# Patient Record
Sex: MALE | Race: OTHER | NOT HISPANIC OR LATINO | Employment: FULL TIME | URBAN - METROPOLITAN AREA
[De-identification: names, ages, dates, MRNs, and addresses within clinical notes are randomized per-mention and may not be internally consistent; named-entity substitution may affect disease eponyms.]

---

## 2018-01-09 ENCOUNTER — OFFICE VISIT (OUTPATIENT)
Dept: URGENT CARE | Facility: CLINIC | Age: 53
End: 2018-01-09
Payer: MEDICAID

## 2018-01-09 VITALS
WEIGHT: 165 LBS | RESPIRATION RATE: 18 BRPM | SYSTOLIC BLOOD PRESSURE: 115 MMHG | HEIGHT: 66 IN | TEMPERATURE: 97 F | DIASTOLIC BLOOD PRESSURE: 71 MMHG | HEART RATE: 80 BPM | OXYGEN SATURATION: 97 % | BODY MASS INDEX: 26.52 KG/M2

## 2018-01-09 DIAGNOSIS — R05.9 COUGH: Primary | ICD-10-CM

## 2018-01-09 DIAGNOSIS — J40 BRONCHITIS: ICD-10-CM

## 2018-01-09 PROCEDURE — 99203 OFFICE O/P NEW LOW 30 MIN: CPT | Mod: S$GLB,,, | Performed by: FAMILY MEDICINE

## 2018-01-09 RX ORDER — BETAMETHASONE SODIUM PHOSPHATE AND BETAMETHASONE ACETATE 3; 3 MG/ML; MG/ML
6 INJECTION, SUSPENSION INTRA-ARTICULAR; INTRALESIONAL; INTRAMUSCULAR; SOFT TISSUE
Status: COMPLETED | OUTPATIENT
Start: 2018-01-09 | End: 2018-01-09

## 2018-01-09 RX ORDER — AZITHROMYCIN 250 MG/1
TABLET, FILM COATED ORAL
Qty: 5 TABLET | Refills: 0 | Status: SHIPPED | OUTPATIENT
Start: 2018-01-09

## 2018-01-09 RX ADMIN — BETAMETHASONE SODIUM PHOSPHATE AND BETAMETHASONE ACETATE 6 MG: 3; 3 INJECTION, SUSPENSION INTRA-ARTICULAR; INTRALESIONAL; INTRAMUSCULAR; SOFT TISSUE at 11:01

## 2018-01-09 NOTE — PROGRESS NOTES
"Subjective:       Patient ID: Devyn Hazel is a 52 y.o. male.    Vitals:  height is 5' 6" (1.676 m) and weight is 74.8 kg (165 lb). His tympanic temperature is 97 °F (36.1 °C). His blood pressure is 115/71 and his pulse is 80. His respiration is 18 and oxygen saturation is 97%.     Chief Complaint: Cough    Cough   This is a new problem. The current episode started 1 to 4 weeks ago. The problem has been unchanged. The problem occurs constantly. The cough is non-productive. Associated symptoms include nasal congestion. Pertinent negatives include no chest pain, chills, ear pain, eye redness, fever, headaches, myalgias, sore throat, shortness of breath or wheezing. Nothing aggravates the symptoms. Treatments tried: Day quil. The treatment provided no relief.     Review of Systems   Constitution: Negative for chills, fever and malaise/fatigue.   HENT: Negative for congestion, ear pain, hoarse voice and sore throat.    Eyes: Negative for discharge and redness.   Cardiovascular: Negative for chest pain, dyspnea on exertion and leg swelling.   Respiratory: Positive for cough. Negative for shortness of breath, sputum production and wheezing.    Musculoskeletal: Negative for myalgias.   Gastrointestinal: Negative for abdominal pain and nausea.   Neurological: Negative for headaches.       Objective:      Physical Exam   Constitutional: He is oriented to person, place, and time. He appears well-developed and well-nourished. He is cooperative.  Non-toxic appearance. He does not appear ill. No distress.   HENT:   Head: Normocephalic and atraumatic.   Right Ear: Hearing, tympanic membrane, external ear and ear canal normal.   Left Ear: Hearing, tympanic membrane, external ear and ear canal normal.   Nose: Nose normal. No mucosal edema, rhinorrhea or nasal deformity. No epistaxis. Right sinus exhibits no maxillary sinus tenderness and no frontal sinus tenderness. Left sinus exhibits no maxillary sinus tenderness and no frontal " sinus tenderness.   Mouth/Throat: Uvula is midline, oropharynx is clear and moist and mucous membranes are normal. No trismus in the jaw. Normal dentition. No uvula swelling. No posterior oropharyngeal erythema.   Eyes: Conjunctivae and lids are normal. Right eye exhibits no discharge. Left eye exhibits no discharge. No scleral icterus.   Sclera clear bilat   Neck: Trachea normal, normal range of motion, full passive range of motion without pain and phonation normal. Neck supple.   Cardiovascular: Normal rate, regular rhythm, normal heart sounds, intact distal pulses and normal pulses.    Pulmonary/Chest: Effort normal and breath sounds normal. He has no wheezes. He has no rales. He exhibits no tenderness.   Abdominal: Soft. Normal appearance and bowel sounds are normal. He exhibits no distension, no pulsatile midline mass and no mass. There is no tenderness.   Musculoskeletal: Normal range of motion. He exhibits no edema or deformity.   Neurological: He is alert and oriented to person, place, and time. He exhibits normal muscle tone. Coordination normal.   Skin: Skin is warm, dry and intact. He is not diaphoretic. No pallor.   Psychiatric: He has a normal mood and affect. His speech is normal and behavior is normal. Judgment and thought content normal. Cognition and memory are normal.   Nursing note and vitals reviewed.      Assessment:       1. Cough    2. Bronchitis        Plan:         Cough  -     X-Ray Chest PA And Lateral; Future; Expected date: 01/09/2018    Bronchitis  -     betamethasone acetate-betamethasone sodium phosphate injection 6 mg; Inject 1 mL (6 mg total) into the muscle one time.  -     azithromycin (ZITHROMAX Z-GHASSAN) 250 MG tablet; Take 2 tablets (500 mg) on  Day 1,  followed by 1 tablet (250 mg) once daily on Days 2 through 5.  Dispense: 5 tablet; Refill: 0        mucinex dm one bid  claritin one daily

## 2018-01-09 NOTE — PATIENT INSTRUCTIONS
What Is Acute Bronchitis?  Acute bronchitis is when the airways in your lungs (bronchial tubes) become red and swollen (inflamed). It is usually caused by a viral infection. But it can also occur because of a bacteria or allergen. Symptoms include a cough that produces yellow or greenish mucus and can last for days or sometimes weeks.  Inside healthy lungs    Air travels in and out of the lungs through the airways. The linings of these airways produce sticky mucus. This mucus traps particles that enter the lungs. Tiny structures called cilia then sweep the particles out of the airways.     Healthy airway: Airways are normally open. Air moves in and out easily.      Healthy cilia: Tiny, hairlike cilia sweep mucus and particles up and out of the airways.   Lungs with bronchitis  Bronchitis often occurs with a cold or the flu virus. The airways become inflamed (red and swollen). There is a deep hacking cough from the extra mucus. Other symptoms may include:  · Wheezing  · Chest discomfort  · Shortness of breath  · Mild fever  A second infection, this time due to bacteria, may then occur. And airways irritated by allergens or smoke are more likely to get infected.        Inflamed airway: Inflammation and extra mucus narrow the airway, causing shortness of breath.      Impaired cilia: Extra mucus impairs cilia, causing congestion and wheezing. Smoking makes the problem worse.   Making a diagnosis  A physical exam, health history, and certain tests help your healthcare provider make the diagnosis.  Health history  Your healthcare provider will ask you about your symptoms.  The exam  Your provider listens to your chest for signs of congestion. He or she may also check your ears, nose, and throat.  Possible tests  · A sputum test for bacteria. This requires a sample of mucus from your lungs.  · A nasal or throat swab. This tests to see if you have a bacterial infection.  · A chest X-ray. This is done if your healthcare  provider thinks you have pneumonia.  · Tests to check for an underlying condition. Other tests may be done to check for things such as allergies, asthma, or COPD (chronic obstructive pulmonary disease). You may need to see a specialist for more lung function testing.  Treating a cough  The main treatment for bronchitis is easing symptoms. Avoiding smoke, allergens, and other things that trigger coughing can often help. If the infection is bacterial, you may be given antibiotics. During the illness, it's important to get plenty of sleep. To ease symptoms:  · Dont smoke. Also avoid secondhand smoke.  · Use a humidifier. Or try breathing in steam from a hot shower. This may help loosen mucus.  · Drink a lot of water and juice. They can soothe the throat and may help thin mucus.  · Sit up or use extra pillows when in bed. This helps to lessen coughing and congestion.  · Ask your provider about using medicine. Ask about using cough medicine, pain and fever medicine, or a decongestant.  Antibiotics  Most cases of bronchitis are caused by cold or flu viruses. They dont need antibiotics to treat them, even if your mucus is thick and green or yellow. Antibiotics dont treat viral illness and antibiotics have not been shown to have any benefit in cases of acute bronchitis. Taking antibiotics when they are not needed increases your risk of getting an infection later that is antibiotic-resistant. Antibiotics can also cause severe cases of diarrhea that require other antibiotics to treat.  It is important that you accept your healthcare provider's opinion to not use antibiotics. Your provider will prescribe antibiotics if the infection is caused by bacteria. If they are prescribed:  · Take all of the medicine. Take the medicine until it is used up, even if symptoms have improved. If you dont, the bronchitis may come back.  · Take the medicines as directed. For instance, some medicines should be taken with food.  · Ask about  side effects. Ask your provider or pharmacist what side effects are common, and what to do about them.  Follow-up care  You should see your provider again in 2 to 3 weeks. By this time, symptoms should have improved. An infection that lasts longer may mean you have a more serious problem.  Prevention  · Avoid tobacco smoke. If you smoke, quit. Stay away from smoky places. Ask friends and family not to smoke around you, or in your home or car.  · Get checked for allergies.  · Ask your provider about getting a yearly flu shot. Also ask about pneumococcal or pneumonia shots.  · Wash your hands often. This helps reduce the chance of picking up viruses that cause colds and flu.  Call your healthcare provider if:  · Symptoms worsen, or you have new symptoms  · Breathing problems worsen or  become severe  · Symptoms dont get better within a week, or within 3 days of taking antibiotics   Date Last Reviewed: 2/1/2017  © 1413-8335 [employer]. [address] [place] [address], [place] [address]. All rights reserved. This information is not intended as a substitute for professional medical care. Always follow your healthcare professional's instructions.

## 2018-07-19 ENCOUNTER — HOSPITAL ENCOUNTER (EMERGENCY)
Facility: HOSPITAL | Age: 53
Discharge: HOME | End: 2018-07-19
Attending: EMERGENCY MEDICINE
Payer: MEDICAID

## 2018-07-19 VITALS
TEMPERATURE: 98 F | SYSTOLIC BLOOD PRESSURE: 151 MMHG | RESPIRATION RATE: 18 BRPM | DIASTOLIC BLOOD PRESSURE: 82 MMHG | HEART RATE: 66 BPM | WEIGHT: 145 LBS | HEIGHT: 66 IN | BODY MASS INDEX: 23.3 KG/M2 | OXYGEN SATURATION: 99 %

## 2018-07-19 DIAGNOSIS — K08.89 PAIN, DENTAL: Primary | ICD-10-CM

## 2018-07-19 PROCEDURE — 99283 EMERGENCY DEPT VISIT LOW MDM: CPT | Mod: 25

## 2018-07-19 PROCEDURE — 96372 THER/PROPH/DIAG INJ SC/IM: CPT

## 2018-07-19 PROCEDURE — 63600175 PHARM REV CODE 636 W HCPCS: Performed by: EMERGENCY MEDICINE

## 2018-07-19 RX ORDER — KETOROLAC TROMETHAMINE 30 MG/ML
30 INJECTION, SOLUTION INTRAMUSCULAR; INTRAVENOUS
Status: COMPLETED | OUTPATIENT
Start: 2018-07-19 | End: 2018-07-19

## 2018-07-19 RX ORDER — NAPROXEN 500 MG/1
500 TABLET ORAL 2 TIMES DAILY WITH MEALS
Qty: 30 TABLET | Refills: 0 | OUTPATIENT
Start: 2018-07-19 | End: 2020-01-21

## 2018-07-19 RX ORDER — ACETAMINOPHEN 500 MG/1
500 TABLET, FILM COATED ORAL EVERY 6 HOURS PRN
COMMUNITY

## 2018-07-19 RX ORDER — LIDOCAINE HYDROCHLORIDE 20 MG/ML
JELLY TOPICAL
Qty: 100 ML | Refills: 0 | OUTPATIENT
Start: 2018-07-19 | End: 2020-01-21

## 2018-07-19 RX ORDER — PENICILLIN V POTASSIUM 500 MG/1
500 TABLET, FILM COATED ORAL 2 TIMES DAILY
Qty: 14 TABLET | Refills: 0 | Status: SHIPPED | OUTPATIENT
Start: 2018-07-19 | End: 2018-07-26

## 2018-07-19 RX ORDER — HYDROCODONE BITARTRATE AND ACETAMINOPHEN 5; 325 MG/1; MG/1
1 TABLET ORAL EVERY 4 HOURS PRN
Qty: 18 TABLET | Refills: 0 | OUTPATIENT
Start: 2018-07-19 | End: 2023-10-14

## 2018-07-19 RX ADMIN — KETOROLAC TROMETHAMINE 30 MG: 30 INJECTION, SOLUTION INTRAMUSCULAR at 01:07

## 2018-07-19 NOTE — ED PROVIDER NOTES
"NAME:  Devyn Hazel  CSN:     [id number]  MRN:    [id number]  ADMIT DATE: 7/19/2018        eMERGENCY dEPARTMENT eNCOUnter    CHIEF COMPLAINT    Chief Complaint   Patient presents with    Dental Pain     ambulatory to triage - started two days ago to top left tooth       HPI      Devyn Hazel is a 53 y.o. male who presents to the ED for evaluation of tooth pain.  Patient complains of pain to the left maxillary molars.  He denies any fevers.  He has taken Orajel and Tylenol with no relief          ALLERGIES    Review of patient's allergies indicates:  No Known Allergies    PAST MEDICAL HISTORY  History reviewed. No pertinent past medical history.    SURGICAL HISTORY    History reviewed. No pertinent surgical history.    SOCIAL HISTORY    Social History     Social History    Marital status:      Spouse name: N/A    Number of children: N/A    Years of education: N/A     Social History Main Topics    Smoking status: Current Every Day Smoker     Packs/day: 1.00     Types: Cigarettes    Smokeless tobacco: Never Used    Alcohol use No    Drug use: No    Sexual activity: Yes     Partners: Female     Other Topics Concern    None     Social History Narrative    None       FAMILY HISTORY    Family History   Problem Relation Age of Onset    Hypertension Mother        REVIEW OF SYSTEMS   ROS  All Systems otherwise negative except as noted in the History of Present Illness.        PHYSICAL EXAM    Reviewed Triage Note  VITAL SIGNS:   ED Triage Vitals [07/19/18 0107]   Enc Vitals Group      /81      Pulse 68      Resp 16      Temp 98 °F (36.7 °C)      Temp src Oral      SpO2 99 %      Weight 145 lb      Height 5' 6"      Head Circumference       Peak Flow       Pain Score       Pain Loc       Pain Edu?       Excl. in GC?        Patient Vitals for the past 24 hrs:   BP Temp Temp src Pulse Resp SpO2 Height Weight   07/19/18 0107 131/81 98 °F (36.7 °C) Oral 68 16 99 % 5' 6" (1.676 m) 65.8 kg (145 lb) "           Physical Exam    Constitutional:  Well-developed, well-nourished. No acute distress  HENT:  Normocephalic, atraumatic. Extensive dental caries. No odontogenic abscess  Eyes:  EOMI. Conjunctiva normal without discharge.   Neck: Normal range of motion.No stridor. No meningismus.   Respiratory:  No respiratory distress, retractions, or conversational dyspnea.   Cardiovascular:  Normal heart rate. No pitting lower extremity edema.   Musculoskeletal:  No gross deformity or limited range of motion of all major joints.   Integument:  Warm and dry. No rash.  Neurologic:  Normal motor function. No focal deficits noted. Alert and Interactive.  Psychiatric:  Affect normal. Mood normal.         LABS  Pertinent labs reviewed. (See chart for details)   Labs Reviewed - No data to display      RADIOLOGY    Imaging Results    None         PROCEDURES    Procedures      EKG     Interpreted by ERP:         ED COURSE & MEDICAL DECISION MAKING    Pertinent & Imaging studies reviewed. (See chart for details and specific orders.)        Medications   ketorolac injection 30 mg (not administered)                 DISPOSITION  Patient discharged in stable condition at No discharge date for patient encounter.      DISCHARGE INSTRUCTIONS & MEDS     Devyn Hazel   Home Medication Instructions GERMAIN:[id number]    Printed on:07/19/18 0130   Medication Information                      acetaminophen (TYLENOL EXTRA STRENGTH) 500 MG tablet  Take 500 mg by mouth every 6 (six) hours as needed for Pain.             azithromycin (ZITHROMAX Z-GHASSAN) 250 MG tablet  Take 2 tablets (500 mg) on  Day 1,  followed by 1 tablet (250 mg) once daily on Days 2 through 5.                   New Prescriptions    HYDROCODONE-ACETAMINOPHEN (NORCO) 5-325 MG PER TABLET    Take 1 tablet by mouth every 4 (four) hours as needed.    LIDOCAINE HCL 2% (XYLOCAINE) 2 % JELLY    Apply topically every 2 (two) hours as needed.    NAPROXEN (NAPROSYN) 500 MG TABLET    Take 1  tablet (500 mg total) by mouth 2 (two) times daily with meals.    PENICILLIN V POTASSIUM (VEETID) 500 MG TABLET    Take 1 tablet (500 mg total) by mouth 2 (two) times daily. for 7 days           FINAL IMPRESSION    1. Pain, dental              Blood Pressure Follow-Up Advised  Patient advised to follow up with PCP within 3-5 days for blood pressure re-check if blood pressure is equal to or greater than 120/80.         Critical care time spent with this patient (not including separately billable items) was  0 minutes.     DISCLAIMER: This note was prepared with Dragon NaturallySpeaking voice recognition transcription software. Garbled syntax, mangled pronouns, and other bizarre constructions may be attributed to that software system.      Ry Muse MD  07/19/2018  1:30 AM          Ry Muse MD  07/19/18 0136

## 2020-01-21 ENCOUNTER — HOSPITAL ENCOUNTER (EMERGENCY)
Facility: HOSPITAL | Age: 55
Discharge: HOME | End: 2020-01-21
Attending: EMERGENCY MEDICINE
Payer: MEDICAID

## 2020-01-21 VITALS
HEIGHT: 66 IN | OXYGEN SATURATION: 98 % | TEMPERATURE: 99 F | DIASTOLIC BLOOD PRESSURE: 91 MMHG | BODY MASS INDEX: 23.63 KG/M2 | SYSTOLIC BLOOD PRESSURE: 132 MMHG | HEART RATE: 98 BPM | RESPIRATION RATE: 17 BRPM | WEIGHT: 147 LBS

## 2020-01-21 DIAGNOSIS — K04.01 ACUTE PULPITIS: Primary | ICD-10-CM

## 2020-01-21 DIAGNOSIS — K08.89 PAIN, DENTAL: ICD-10-CM

## 2020-01-21 PROCEDURE — 99284 EMERGENCY DEPT VISIT MOD MDM: CPT

## 2020-01-21 PROCEDURE — 25000003 PHARM REV CODE 250: Performed by: EMERGENCY MEDICINE

## 2020-01-21 RX ORDER — CHLORHEXIDINE GLUCONATE ORAL RINSE 1.2 MG/ML
15 SOLUTION DENTAL 2 TIMES DAILY
Qty: 118 ML | Refills: 0 | Status: SHIPPED | OUTPATIENT
Start: 2020-01-21 | End: 2020-01-25

## 2020-01-21 RX ORDER — NAPROXEN 500 MG/1
500 TABLET ORAL 2 TIMES DAILY PRN
Qty: 30 TABLET | Refills: 0 | Status: SHIPPED | OUTPATIENT
Start: 2020-01-21

## 2020-01-21 RX ORDER — LIDOCAINE HYDROCHLORIDE 20 MG/ML
JELLY TOPICAL
Qty: 5 ML | Refills: 0 | Status: SHIPPED | OUTPATIENT
Start: 2020-01-21

## 2020-01-21 RX ORDER — IBUPROFEN 400 MG/1
800 TABLET ORAL
Status: COMPLETED | OUTPATIENT
Start: 2020-01-21 | End: 2020-01-21

## 2020-01-21 RX ORDER — PENICILLIN V POTASSIUM 500 MG/1
500 TABLET, FILM COATED ORAL 4 TIMES DAILY
Qty: 28 TABLET | Refills: 0 | Status: SHIPPED | OUTPATIENT
Start: 2020-01-21 | End: 2020-01-28

## 2020-01-21 RX ADMIN — IBUPROFEN 800 MG: 400 TABLET, FILM COATED ORAL at 03:01

## 2020-01-21 NOTE — DISCHARGE INSTRUCTIONS
Follow up with your dentist in the next 24-48 hours.  Return to the emergency department for any new or worsening symptoms.

## 2020-01-21 NOTE — ED PROVIDER NOTES
Encounter Date: 1/21/2020    SCRIBE #1 NOTE: I, Gracia Carrillo, am scribing for, and in the presence of,  Dr. Pedro. I have scribed the entire note.       History     Chief Complaint   Patient presents with    Dental Pain     54y M ambulatory to ED from home with c/o right lower tooth ache x1 night         Pt is a 55 yo M who presents to the ED with the complaint of R lower dental pain that started PTA. Pt denies any difficulty breathing, swallowing or throat pain. He states that the pain is localized to the R lower molar. He denies any recent trauma or dental manipulation. Furthermore, he denies any cough, fever, chills, sore throat, neck pain, wheezing, stridor, voice change when asked. The patient states that he has not taken anything for his pain. Pt states that he has a dentist, and he last saw the dentist approximately one year ago.     The history is provided by the patient.     Review of patient's allergies indicates:  No Known Allergies  No past medical history on file.  No past surgical history on file.  Family History   Problem Relation Age of Onset    Hypertension Mother      Social History     Tobacco Use    Smoking status: Current Every Day Smoker     Packs/day: 1.00     Types: Cigarettes    Smokeless tobacco: Never Used   Substance Use Topics    Alcohol use: No    Drug use: No     Review of Systems   Constitutional: Negative for chills and fever.   HENT: Positive for dental problem. Negative for postnasal drip, sore throat, trouble swallowing and voice change.    Eyes: Negative for redness.   Respiratory: Negative for chest tightness, shortness of breath, wheezing and stridor.    Cardiovascular: Negative for chest pain.   Gastrointestinal: Negative for abdominal pain, diarrhea, nausea and vomiting.   Genitourinary: Negative for dysuria and hematuria.   Musculoskeletal: Negative for back pain.   Skin: Negative for rash.   Neurological: Negative for headaches.       Physical Exam     Initial  "Vitals [01/21/20 0304]   BP Pulse Resp Temp SpO2   (!) 132/91 98 17 98.7 °F (37.1 °C) 98 %      MAP       --         Physical Exam    Nursing note and vitals reviewed.  Constitutional: He appears well-developed and well-nourished. He is not diaphoretic. No distress.   Well-appearing  No acute distress  Non toxic in appearance    HENT:   Head: Normocephalic and atraumatic.   Mouth/Throat: Uvula is midline and oropharynx is clear and moist. No trismus in the jaw. Dental caries present. No dental abscesses.       No Lyle's. No abscess. Pt reports pain to right lower molar. Extensive carries throughout. Uvula midline. Tolerates secretion with no difficulty. No trismus.   Eyes: Conjunctivae and EOM are normal.   Neck: Normal range of motion. Neck supple.   Pt able to range neck in all directions  No tenderness or "woody" feel to submandibular region suggestive of Lyle's angina.    Cardiovascular: Normal rate, regular rhythm and normal heart sounds.   Pulmonary/Chest: Breath sounds normal. No respiratory distress.   Lungs clear to auscultation  No stridor  No wheezing    Abdominal: Soft. There is no tenderness.   Abdomen is soft/NT/ND; normal bowel sounds in all four quadrants    Musculoskeletal: Normal range of motion. He exhibits no edema or tenderness.   Neurological: He is alert and oriented to person, place, and time. He has normal strength.   Skin: Skin is warm and dry.         ED Course   Procedures  Labs Reviewed - No data to display       Imaging Results    None          Medical Decision Making:   ED Management:  There is no indication for labs or advanced imaging at this time. The patient is well-appearing and in no acute distress. I do not suspect deep space infection or sepsis. Pt is stable for follow up with his dentist as an OP. I will give the patient rx for PCN VK, Naproxen, Lidocaine jelly and Peridex. Pt given strict return precautions for any new or worsening symptoms. Pt verbalized understanding, " compliance, and agreement with the aforementioned treatment plan.                                  Clinical Impression:       ICD-10-CM ICD-9-CM   1. Acute pulpitis K04.01 522.0   2. Pain, dental K08.89 525.9            I, Vitor Pedro,  personally performed the services described in this documentation. All medical record entries made by the scribe were at my direction and in my presence.  I have reviewed the chart and agree that the record reflects my personal performance and is accurate and complete. Vitor Pedro M.D. 3:27 AM01/21/2020               Vitor Pedro MD  01/21/20 0327

## 2020-01-21 NOTE — ED NOTES
Patient identifiers for Devyn Hazel checked and correct.  LOC: The patient is awake, alert and aware of environment with an appropriate affect, the patient is oriented x 3 and speaking appropriately.  APPEARANCE: Patient uncomfortable and in no acute distress, patient is clean and well groomed, patient's clothing are properly fastened.  SKIN: The skin is warm and dry, patient has normal skin turgor and moist mucus membranes.  Dental caries.  MUSKULOSKELETAL: Patient moving all extremities well, no obvious swelling or deformities noted.

## 2020-03-09 ENCOUNTER — CLINICAL SUPPORT (OUTPATIENT)
Dept: URGENT CARE | Facility: CLINIC | Age: 55
End: 2020-03-09

## 2020-03-09 DIAGNOSIS — Z02.89 ENCOUNTER FOR EXAMINATION REQUIRED BY DEPARTMENT OF TRANSPORTATION (DOT): Primary | ICD-10-CM

## 2020-03-09 PROCEDURE — 99499 PHYSICAL, RECERT DOT/CDL: ICD-10-PCS | Mod: S$GLB,,, | Performed by: NURSE PRACTITIONER

## 2020-03-09 PROCEDURE — 99499 UNLISTED E&M SERVICE: CPT | Mod: S$GLB,,, | Performed by: NURSE PRACTITIONER

## 2021-08-05 ENCOUNTER — HOSPITAL ENCOUNTER (EMERGENCY)
Facility: HOSPITAL | Age: 56
Discharge: HOME | End: 2021-08-05
Attending: EMERGENCY MEDICINE
Payer: MEDICAID

## 2021-08-05 VITALS
WEIGHT: 145 LBS | SYSTOLIC BLOOD PRESSURE: 116 MMHG | HEART RATE: 80 BPM | BODY MASS INDEX: 23.3 KG/M2 | OXYGEN SATURATION: 99 % | TEMPERATURE: 99 F | HEIGHT: 66 IN | RESPIRATION RATE: 16 BRPM | DIASTOLIC BLOOD PRESSURE: 56 MMHG

## 2021-08-05 DIAGNOSIS — B35.3 TINEA PEDIS OF BOTH FEET: Primary | ICD-10-CM

## 2021-08-05 PROCEDURE — 99283 EMERGENCY DEPT VISIT LOW MDM: CPT

## 2021-08-05 RX ORDER — CLOTRIMAZOLE 1 %
CREAM (GRAM) TOPICAL
Qty: 15 G | Refills: 0 | Status: SHIPPED | OUTPATIENT
Start: 2021-08-05

## 2021-09-09 ENCOUNTER — PATIENT MESSAGE (OUTPATIENT)
Dept: FAMILY MEDICINE | Facility: HOSPITAL | Age: 56
End: 2021-09-09

## 2021-09-09 ENCOUNTER — OFFICE VISIT (OUTPATIENT)
Dept: FAMILY MEDICINE | Facility: HOSPITAL | Age: 56
End: 2021-09-09
Attending: EMERGENCY MEDICINE
Payer: MEDICAID

## 2021-09-09 ENCOUNTER — LAB VISIT (OUTPATIENT)
Dept: LAB | Facility: HOSPITAL | Age: 56
End: 2021-09-09
Attending: EMERGENCY MEDICINE
Payer: MEDICAID

## 2021-09-09 VITALS
HEIGHT: 66 IN | WEIGHT: 139.31 LBS | DIASTOLIC BLOOD PRESSURE: 74 MMHG | HEART RATE: 82 BPM | SYSTOLIC BLOOD PRESSURE: 111 MMHG | BODY MASS INDEX: 22.39 KG/M2

## 2021-09-09 DIAGNOSIS — B35.3 TINEA PEDIS OF BOTH FEET: ICD-10-CM

## 2021-09-09 DIAGNOSIS — B35.3 TINEA PEDIS OF BOTH FEET: Primary | ICD-10-CM

## 2021-09-09 LAB
ALBUMIN SERPL BCP-MCNC: 4 G/DL (ref 3.5–5.2)
ALP SERPL-CCNC: 77 U/L (ref 55–135)
ALT SERPL W/O P-5'-P-CCNC: 11 U/L (ref 10–44)
ANION GAP SERPL CALC-SCNC: 10 MMOL/L (ref 8–16)
AST SERPL-CCNC: 16 U/L (ref 10–40)
BILIRUB SERPL-MCNC: 0.7 MG/DL (ref 0.1–1)
BUN SERPL-MCNC: 18 MG/DL (ref 6–20)
CALCIUM SERPL-MCNC: 9.4 MG/DL (ref 8.7–10.5)
CHLORIDE SERPL-SCNC: 107 MMOL/L (ref 95–110)
CO2 SERPL-SCNC: 24 MMOL/L (ref 23–29)
CREAT SERPL-MCNC: 1 MG/DL (ref 0.5–1.4)
EST. GFR  (AFRICAN AMERICAN): >60 ML/MIN/1.73 M^2
EST. GFR  (NON AFRICAN AMERICAN): >60 ML/MIN/1.73 M^2
GLUCOSE SERPL-MCNC: 76 MG/DL (ref 70–110)
POTASSIUM SERPL-SCNC: 4.2 MMOL/L (ref 3.5–5.1)
PROT SERPL-MCNC: 7.2 G/DL (ref 6–8.4)
SODIUM SERPL-SCNC: 141 MMOL/L (ref 136–145)

## 2021-09-09 PROCEDURE — 36415 COLL VENOUS BLD VENIPUNCTURE: CPT | Performed by: STUDENT IN AN ORGANIZED HEALTH CARE EDUCATION/TRAINING PROGRAM

## 2021-09-09 PROCEDURE — 80053 COMPREHEN METABOLIC PANEL: CPT | Performed by: STUDENT IN AN ORGANIZED HEALTH CARE EDUCATION/TRAINING PROGRAM

## 2021-09-09 PROCEDURE — 99214 OFFICE O/P EST MOD 30 MIN: CPT | Performed by: STUDENT IN AN ORGANIZED HEALTH CARE EDUCATION/TRAINING PROGRAM

## 2021-09-09 RX ORDER — TERBINAFINE HYDROCHLORIDE 250 MG/1
250 TABLET ORAL DAILY
Qty: 30 TABLET | Refills: 1 | Status: SHIPPED | OUTPATIENT
Start: 2021-09-09 | End: 2021-10-09

## 2021-11-27 ENCOUNTER — HOSPITAL ENCOUNTER (EMERGENCY)
Facility: HOSPITAL | Age: 56
Discharge: HOME | End: 2021-11-27
Attending: EMERGENCY MEDICINE
Payer: MEDICAID

## 2021-11-27 VITALS
HEART RATE: 70 BPM | HEIGHT: 66 IN | DIASTOLIC BLOOD PRESSURE: 70 MMHG | BODY MASS INDEX: 22.66 KG/M2 | RESPIRATION RATE: 18 BRPM | TEMPERATURE: 98 F | SYSTOLIC BLOOD PRESSURE: 136 MMHG | WEIGHT: 141 LBS | OXYGEN SATURATION: 96 %

## 2021-11-27 DIAGNOSIS — L03.011 PARONYCHIA OF FINGER, RIGHT: Primary | ICD-10-CM

## 2021-11-27 PROCEDURE — 10060 I&D ABSCESS SIMPLE/SINGLE: CPT

## 2021-11-27 PROCEDURE — 99284 EMERGENCY DEPT VISIT MOD MDM: CPT | Mod: 25

## 2021-11-27 RX ORDER — DICLOFENAC SODIUM 25 MG/1
25 TABLET, DELAYED RELEASE ORAL 3 TIMES DAILY PRN
Qty: 9 TABLET | Refills: 0 | Status: SHIPPED | OUTPATIENT
Start: 2021-11-27 | End: 2021-11-30

## 2021-11-27 RX ORDER — MUPIROCIN 20 MG/G
OINTMENT TOPICAL 3 TIMES DAILY
Qty: 1 G | Refills: 0 | Status: SHIPPED | OUTPATIENT
Start: 2021-11-27

## 2022-05-11 ENCOUNTER — OFFICE VISIT (OUTPATIENT)
Dept: URGENT CARE | Facility: CLINIC | Age: 57
End: 2022-05-11
Payer: MEDICAID

## 2022-05-11 VITALS
HEIGHT: 69 IN | SYSTOLIC BLOOD PRESSURE: 120 MMHG | HEART RATE: 73 BPM | OXYGEN SATURATION: 97 % | TEMPERATURE: 98 F | DIASTOLIC BLOOD PRESSURE: 77 MMHG | RESPIRATION RATE: 17 BRPM | WEIGHT: 145 LBS | BODY MASS INDEX: 21.48 KG/M2

## 2022-05-11 DIAGNOSIS — L30.9 DERMATITIS: ICD-10-CM

## 2022-05-11 DIAGNOSIS — R21 RASH OF FINGER: Primary | ICD-10-CM

## 2022-05-11 PROCEDURE — 99203 OFFICE O/P NEW LOW 30 MIN: CPT | Mod: S$GLB,,, | Performed by: STUDENT IN AN ORGANIZED HEALTH CARE EDUCATION/TRAINING PROGRAM

## 2022-05-11 PROCEDURE — 3078F PR MOST RECENT DIASTOLIC BLOOD PRESSURE < 80 MM HG: ICD-10-PCS | Mod: CPTII,S$GLB,, | Performed by: STUDENT IN AN ORGANIZED HEALTH CARE EDUCATION/TRAINING PROGRAM

## 2022-05-11 PROCEDURE — 99203 PR OFFICE/OUTPT VISIT, NEW, LEVL III, 30-44 MIN: ICD-10-PCS | Mod: S$GLB,,, | Performed by: STUDENT IN AN ORGANIZED HEALTH CARE EDUCATION/TRAINING PROGRAM

## 2022-05-11 PROCEDURE — 1159F PR MEDICATION LIST DOCUMENTED IN MEDICAL RECORD: ICD-10-PCS | Mod: CPTII,S$GLB,, | Performed by: STUDENT IN AN ORGANIZED HEALTH CARE EDUCATION/TRAINING PROGRAM

## 2022-05-11 PROCEDURE — 1160F RVW MEDS BY RX/DR IN RCRD: CPT | Mod: CPTII,S$GLB,, | Performed by: STUDENT IN AN ORGANIZED HEALTH CARE EDUCATION/TRAINING PROGRAM

## 2022-05-11 PROCEDURE — 1159F MED LIST DOCD IN RCRD: CPT | Mod: CPTII,S$GLB,, | Performed by: STUDENT IN AN ORGANIZED HEALTH CARE EDUCATION/TRAINING PROGRAM

## 2022-05-11 PROCEDURE — 3074F SYST BP LT 130 MM HG: CPT | Mod: CPTII,S$GLB,, | Performed by: STUDENT IN AN ORGANIZED HEALTH CARE EDUCATION/TRAINING PROGRAM

## 2022-05-11 PROCEDURE — 3008F BODY MASS INDEX DOCD: CPT | Mod: CPTII,S$GLB,, | Performed by: STUDENT IN AN ORGANIZED HEALTH CARE EDUCATION/TRAINING PROGRAM

## 2022-05-11 PROCEDURE — 1160F PR REVIEW ALL MEDS BY PRESCRIBER/CLIN PHARMACIST DOCUMENTED: ICD-10-PCS | Mod: CPTII,S$GLB,, | Performed by: STUDENT IN AN ORGANIZED HEALTH CARE EDUCATION/TRAINING PROGRAM

## 2022-05-11 PROCEDURE — 3008F PR BODY MASS INDEX (BMI) DOCUMENTED: ICD-10-PCS | Mod: CPTII,S$GLB,, | Performed by: STUDENT IN AN ORGANIZED HEALTH CARE EDUCATION/TRAINING PROGRAM

## 2022-05-11 PROCEDURE — 3074F PR MOST RECENT SYSTOLIC BLOOD PRESSURE < 130 MM HG: ICD-10-PCS | Mod: CPTII,S$GLB,, | Performed by: STUDENT IN AN ORGANIZED HEALTH CARE EDUCATION/TRAINING PROGRAM

## 2022-05-11 PROCEDURE — 3078F DIAST BP <80 MM HG: CPT | Mod: CPTII,S$GLB,, | Performed by: STUDENT IN AN ORGANIZED HEALTH CARE EDUCATION/TRAINING PROGRAM

## 2022-05-11 RX ORDER — NYSTATIN AND TRIAMCINOLONE ACETONIDE 100000; 1 [USP'U]/G; MG/G
OINTMENT TOPICAL 2 TIMES DAILY
Qty: 30 G | Refills: 0 | Status: SHIPPED | OUTPATIENT
Start: 2022-05-11 | End: 2022-05-21

## 2022-05-11 NOTE — PROGRESS NOTES
"Subjective:       Patient ID: Devyn Hazel is a 57 y.o. male.    Vitals:  height is 5' 9" (1.753 m) and weight is 65.8 kg (145 lb). His temperature is 98.1 °F (36.7 °C). His blood pressure is 120/77 and his pulse is 73. His respiration is 17 and oxygen saturation is 97%.     Chief Complaint: Rash    Pt presents for L ring finger rash x2-3mo. States no known precipitant and that it has waxed and waned since it started. Rash is on the inside of L ring finger with redness, peeling, and itching. When itches and peels has associated pain and mild swelling. No jewelry on finger or new lotions/scents. Rash has not spread to other areas. Has not tried anything for it. Denied f/c, joint pain, nail changes, prior episodes.     Rash  This is a new problem. The current episode started more than 1 month ago. The problem has been waxing and waning since onset. The affected locations include the left fingers. The rash is characterized by swelling, itchiness, peeling and pain. It is unknown if there was an exposure to a precipitant. Pertinent negatives include no cough, diarrhea, fatigue, fever, joint pain or nail changes. Past treatments include nothing. The treatment provided no relief. There is no history of allergies or eczema.       Constitution: Negative for chills, fatigue and fever.   Eyes: Negative.    Respiratory: Negative for cough.    Gastrointestinal: Negative for diarrhea.   Musculoskeletal: Negative for joint pain, joint swelling and abnormal ROM of joint.   Skin: Positive for rash. Negative for wound, erythema, bruising, abscess and avulsion.   Neurological: Negative for headaches and numbness.   Psychiatric/Behavioral: Negative for confusion.       Objective:      Physical Exam   Constitutional: He is oriented to person, place, and time. He appears well-developed. He does not appear ill. No distress.   HENT:   Head: Normocephalic and atraumatic.   Ears:   Right Ear: External ear normal.   Left Ear: External ear " normal.   Eyes: Conjunctivae and EOM are normal.   Neck: Neck supple.   Cardiovascular: Normal rate, regular rhythm and normal pulses.   Musculoskeletal: Normal range of motion.         General: Tenderness (mildly tender of excoriated rash) present. No deformity. Normal range of motion.   Neurological: He is alert and oriented to person, place, and time. He displays no weakness. No cranial nerve deficit (CN II-XII grossly intact) or sensory deficit. He exhibits normal muscle tone.   Skin: Skin is warm, dry and rash. Capillary refill takes less than 2 seconds. No erythema         Comments: Radial proximal L ring finger with Excoriated rash with erythematous base and well demarcated borders with scattered scaling and mild edema   Psychiatric: His behavior is normal. Judgment and thought content normal.   Nursing note and vitals reviewed.        Assessment:       1. Rash of finger    2. Dermatitis          Plan:         Rash of finger  -     nystatin-triamcinolone (MYCOLOG) ointment; Apply topically 2 (two) times daily. for 10 days  Dispense: 30 g; Refill: 0    Dermatitis  -     nystatin-triamcinolone (MYCOLOG) ointment; Apply topically 2 (two) times daily. for 10 days  Dispense: 30 g; Refill: 0    Medications and diagnosis reviewed with patient, questions answered, and return precautions given.    Follow up in about 1 week (around 5/18/2022) for re-evaluation with PCP if no improvement or worsening.    Nicko Hubbard MD/MPH  [facility]  [facility]

## 2022-05-24 ENCOUNTER — OFFICE VISIT (OUTPATIENT)
Dept: URGENT CARE | Facility: CLINIC | Age: 57
End: 2022-05-24
Payer: MEDICAID

## 2022-05-24 VITALS
TEMPERATURE: 98 F | RESPIRATION RATE: 18 BRPM | HEIGHT: 69 IN | WEIGHT: 145 LBS | SYSTOLIC BLOOD PRESSURE: 116 MMHG | BODY MASS INDEX: 21.48 KG/M2 | OXYGEN SATURATION: 98 % | HEART RATE: 86 BPM | DIASTOLIC BLOOD PRESSURE: 80 MMHG

## 2022-05-24 DIAGNOSIS — S39.012A STRAIN OF LUMBAR REGION, INITIAL ENCOUNTER: Primary | ICD-10-CM

## 2022-05-24 LAB
CTP QC/QA: YES
SARS-COV-2 RDRP RESP QL NAA+PROBE: POSITIVE

## 2022-05-24 PROCEDURE — 3079F PR MOST RECENT DIASTOLIC BLOOD PRESSURE 80-89 MM HG: ICD-10-PCS | Mod: CPTII,S$GLB,, | Performed by: FAMILY MEDICINE

## 2022-05-24 PROCEDURE — 3008F PR BODY MASS INDEX (BMI) DOCUMENTED: ICD-10-PCS | Mod: CPTII,S$GLB,, | Performed by: FAMILY MEDICINE

## 2022-05-24 PROCEDURE — 3079F DIAST BP 80-89 MM HG: CPT | Mod: CPTII,S$GLB,, | Performed by: FAMILY MEDICINE

## 2022-05-24 PROCEDURE — 3074F SYST BP LT 130 MM HG: CPT | Mod: CPTII,S$GLB,, | Performed by: FAMILY MEDICINE

## 2022-05-24 PROCEDURE — U0002: ICD-10-PCS | Mod: QW,S$GLB,, | Performed by: FAMILY MEDICINE

## 2022-05-24 PROCEDURE — U0002 COVID-19 LAB TEST NON-CDC: HCPCS | Mod: QW,S$GLB,, | Performed by: FAMILY MEDICINE

## 2022-05-24 PROCEDURE — 99213 PR OFFICE/OUTPT VISIT, EST, LEVL III, 20-29 MIN: ICD-10-PCS | Mod: S$GLB,,, | Performed by: FAMILY MEDICINE

## 2022-05-24 PROCEDURE — 3074F PR MOST RECENT SYSTOLIC BLOOD PRESSURE < 130 MM HG: ICD-10-PCS | Mod: CPTII,S$GLB,, | Performed by: FAMILY MEDICINE

## 2022-05-24 PROCEDURE — 3008F BODY MASS INDEX DOCD: CPT | Mod: CPTII,S$GLB,, | Performed by: FAMILY MEDICINE

## 2022-05-24 PROCEDURE — 99213 OFFICE O/P EST LOW 20 MIN: CPT | Mod: S$GLB,,, | Performed by: FAMILY MEDICINE

## 2022-05-24 RX ORDER — DICLOFENAC SODIUM 75 MG/1
75 TABLET, DELAYED RELEASE ORAL 2 TIMES DAILY
Qty: 60 TABLET | Refills: 1 | Status: SHIPPED | OUTPATIENT
Start: 2022-05-24

## 2022-05-24 RX ORDER — AMOXICILLIN 500 MG/1
500 CAPSULE ORAL EVERY 8 HOURS
COMMUNITY
Start: 2022-01-19

## 2022-05-24 RX ORDER — CLOTRIMAZOLE AND BETAMETHASONE DIPROPIONATE 10; .64 MG/G; MG/G
CREAM TOPICAL
COMMUNITY
Start: 2022-05-23

## 2022-05-24 RX ORDER — CYCLOBENZAPRINE HCL 10 MG
10 TABLET ORAL 3 TIMES DAILY PRN
Qty: 30 TABLET | Refills: 0 | Status: SHIPPED | OUTPATIENT
Start: 2022-05-24 | End: 2022-06-03

## 2022-05-24 RX ORDER — PRAVASTATIN SODIUM 20 MG/1
20 TABLET ORAL NIGHTLY
COMMUNITY
Start: 2022-04-27

## 2022-05-24 RX ORDER — ERGOCALCIFEROL 1.25 MG/1
50000 CAPSULE ORAL
Qty: 10 CAPSULE | Refills: 0 | Status: SHIPPED | OUTPATIENT
Start: 2022-05-24

## 2022-05-24 RX ORDER — LIDOCAINE AND PRILOCAINE 25; 25 MG/G; MG/G
CREAM TOPICAL
COMMUNITY
Start: 2021-06-19 | End: 2022-06-19

## 2022-05-24 RX ORDER — CLOTRIMAZOLE 1 %
CREAM (GRAM) TOPICAL
COMMUNITY
Start: 2021-06-07 | End: 2022-06-07

## 2022-05-24 RX ORDER — TRIAMCINOLONE ACETONIDE 5 MG/G
CREAM TOPICAL
COMMUNITY
Start: 2021-06-07 | End: 2022-06-07

## 2022-06-04 ENCOUNTER — CLINICAL SUPPORT (OUTPATIENT)
Dept: URGENT CARE | Facility: CLINIC | Age: 57
End: 2022-06-04
Payer: MEDICAID

## 2022-06-04 DIAGNOSIS — Z20.822 ENCOUNTER FOR LABORATORY TESTING FOR COVID-19 VIRUS: ICD-10-CM

## 2022-06-04 LAB — SARS-COV-2 RNA RESP QL NAA+PROBE: NOT DETECTED

## 2022-06-04 PROCEDURE — U0005 INFEC AGEN DETEC AMPLI PROBE: HCPCS

## 2022-06-04 PROCEDURE — U0003 INFECTIOUS AGENT DETECTION BY NUCLEIC ACID (DNA OR RNA); SEVERE ACUTE RESPIRATORY SYNDROME CORONAVIRUS 2 (SARS-COV-2) (CORONAVIRUS DISEASE [COVID-19]), AMPLIFIED PROBE TECHNIQUE, MAKING USE OF HIGH THROUGHPUT TECHNOLOGIES AS DESCRIBED BY CMS-2020-01-R: HCPCS

## 2022-06-05 ENCOUNTER — TELEPHONE (OUTPATIENT)
Dept: URGENT CARE | Facility: CLINIC | Age: 57
End: 2022-06-05
Payer: MEDICAID

## 2022-08-05 ENCOUNTER — HOSPITAL ENCOUNTER (EMERGENCY)
Facility: HOSPITAL | Age: 57
Discharge: HOME | End: 2022-08-05
Attending: EMERGENCY MEDICINE
Payer: MEDICAID

## 2022-08-05 VITALS
WEIGHT: 140 LBS | DIASTOLIC BLOOD PRESSURE: 59 MMHG | OXYGEN SATURATION: 100 % | HEIGHT: 66 IN | RESPIRATION RATE: 15 BRPM | BODY MASS INDEX: 22.5 KG/M2 | TEMPERATURE: 98 F | SYSTOLIC BLOOD PRESSURE: 121 MMHG | HEART RATE: 74 BPM

## 2022-08-05 DIAGNOSIS — M54.50 ACUTE BILATERAL LOW BACK PAIN WITHOUT SCIATICA: Primary | ICD-10-CM

## 2022-08-05 DIAGNOSIS — S39.012A BACK STRAIN, INITIAL ENCOUNTER: ICD-10-CM

## 2022-08-05 LAB
BILIRUB UR QL STRIP: NEGATIVE
CLARITY UR: CLEAR
COLOR UR: YELLOW
GLUCOSE UR QL STRIP: NEGATIVE
HGB UR QL STRIP: NEGATIVE
KETONES UR QL STRIP: NEGATIVE
LEUKOCYTE ESTERASE UR QL STRIP: NEGATIVE
NITRITE UR QL STRIP: NEGATIVE
PH UR STRIP: 7 [PH] (ref 5–8)
PROT UR QL STRIP: NEGATIVE
SP GR UR STRIP: 1.02 (ref 1–1.03)
URN SPEC COLLECT METH UR: NORMAL
UROBILINOGEN UR STRIP-ACNC: NEGATIVE EU/DL

## 2022-08-05 PROCEDURE — 99284 EMERGENCY DEPT VISIT MOD MDM: CPT

## 2022-08-05 PROCEDURE — 96372 THER/PROPH/DIAG INJ SC/IM: CPT | Performed by: EMERGENCY MEDICINE

## 2022-08-05 PROCEDURE — 81003 URINALYSIS AUTO W/O SCOPE: CPT | Performed by: EMERGENCY MEDICINE

## 2022-08-05 PROCEDURE — 63600175 PHARM REV CODE 636 W HCPCS: Performed by: EMERGENCY MEDICINE

## 2022-08-05 PROCEDURE — 25000003 PHARM REV CODE 250: Performed by: EMERGENCY MEDICINE

## 2022-08-05 RX ORDER — CYCLOBENZAPRINE HCL 10 MG
10 TABLET ORAL 3 TIMES DAILY PRN
Qty: 15 TABLET | Refills: 0 | Status: SHIPPED | OUTPATIENT
Start: 2022-08-05 | End: 2022-08-10

## 2022-08-05 RX ORDER — ACETAMINOPHEN 500 MG
1000 TABLET ORAL EVERY 6 HOURS PRN
Qty: 50 TABLET | Refills: 0 | Status: SHIPPED | OUTPATIENT
Start: 2022-08-05

## 2022-08-05 RX ORDER — ACETAMINOPHEN 500 MG
1000 TABLET ORAL
Status: COMPLETED | OUTPATIENT
Start: 2022-08-05 | End: 2022-08-05

## 2022-08-05 RX ORDER — KETOROLAC TROMETHAMINE 30 MG/ML
10 INJECTION, SOLUTION INTRAMUSCULAR; INTRAVENOUS
Status: COMPLETED | OUTPATIENT
Start: 2022-08-05 | End: 2022-08-05

## 2022-08-05 RX ADMIN — ACETAMINOPHEN 1000 MG: 500 TABLET ORAL at 03:08

## 2022-08-05 RX ADMIN — KETOROLAC TROMETHAMINE 10 MG: 30 INJECTION, SOLUTION INTRAMUSCULAR at 03:08

## 2022-08-05 NOTE — ED PROVIDER NOTES
Encounter Date: 8/5/2022       History     Chief Complaint   Patient presents with    Back Pain     Pt. Reports pain across lower back x3 days. Pt. Has no comfortable position. Appears uncomfortable. Denies urinary symptoms. Denies known injury     57-year-old man without any known health problems who presents for evaluation of pain in the lower back over last couple of days, the day prior to starting it he notes that he may have lifted up something heavy like luggage but did not have any initial pain with that.  The pain has been persistent since that time despite taking some Tylenol yesterday.  He denies any loss of bladder or bowel continence, any fevers, chills or any other injury specifically to the back.  He once had an episode like this many years prior without any obvious cause being identified.        Review of patient's allergies indicates:  No Known Allergies  No past medical history on file.  No past surgical history on file.  Family History   Problem Relation Age of Onset    Hypertension Mother      Social History     Tobacco Use    Smoking status: Current Every Day Smoker     Packs/day: 1.00     Types: Cigarettes    Smokeless tobacco: Never Used   Substance Use Topics    Alcohol use: No    Drug use: No     Review of Systems  Constitutional-no fever  HEENT-no congestion  Eyes-no redness  Respiratory-no shortness of breath  Cardio-no chest pain  GI-no abdominal pain  Endocrine-no cold intolerance  -no difficulty urinating  MSK-positive back pain  Skin-no rashes  Allergy-no environmental allergy  Neurologic-, no headache  Hematology-no swollen nodes  Behavioral-no confusion  Physical Exam     Initial Vitals [08/05/22 1405]   BP Pulse Resp Temp SpO2   104/64 82 20 98.3 °F (36.8 °C) 98 %      MAP       --         Physical Exam  Constitutional: Well appearing, no distress.  Eyes: Conjunctivae normal.  ENT       Head: Normocephalic, atraumatic.       Nose: Normal external appearance         Mouth/Throat: no strigulous respirations   Hematological/Lymphatic/Immunilogical: no visible lymphadenopathy   Cardiovascular: Normal rate,   Respiratory: Normal respiratory effort.   Gastrointestinal: non distended   Musculoskeletal: Normal range of motion in all extremities.  Positive tenderness in the paraspinal muscles lumbar spine, palpable knots  Neurologic: Alert, oriented. Normal speech and language. No gross focal neurologic deficits are appreciated.  Skin: Skin is warm, dry. No rash noted.  Psychiatric: Mood and affect are normal.   ED Course   Procedures  Labs Reviewed   URINALYSIS, REFLEX TO URINE CULTURE    Narrative:     Specimen Source->Urine          Imaging Results          X-Ray Lumbar Spine Ap And Lateral (Final result)  Result time 08/05/22 16:51:32    Final result by Nate Duran MD (08/05/22 16:51:32)                 Impression:      1. No acute displaced fracture or dislocation of the lumbar spine.      Electronically signed by: Nate Duran MD  Date:    08/05/2022  Time:    16:51             Narrative:    EXAMINATION:  XR LUMBAR SPINE AP AND LATERAL    CLINICAL HISTORY:  back pain;    TECHNIQUE:  AP, lateral and spot images were performed of the lumbar spine.    COMPARISON:  None    FINDINGS:  Three views lumbar spine.    Lateral imaging demonstrates grade 1 anterolisthesis of L5 on L4.  No significant vertebral body height loss.  There is disc space height loss at L5-S1.  The facet joints are aligned.  The visualized sacral segments are aligned.  AP spinal alignment is grossly unremarkable.  The bilateral sacroiliac joints are intact.                                 Medications   ketorolac injection 9.999 mg (9.999 mg Intramuscular Given 8/5/22 1531)   acetaminophen tablet 1,000 mg (1,000 mg Oral Given 8/5/22 1531)     Medical Decision Making:   History:   Old Medical Records: I decided to obtain old medical records.  Old Records Summarized: records from clinic visits and records  from previous admission(s).  Differential Diagnosis:   Low back pain is a profoundly broad differential including benign back strains and spasms to serious structural issues such as cauda equina syndrome, prolapsed disc, epidural abscess or pathologic fractures even to back pain mimics like pyelonephritis, AAA or ischemic bowel.    This patient does not have significant fever, no neurologic impairment to suggest compressive lesion such as epidural abscess or cauda equina syndrome.   Reflexes are appropriate and at baseline.     The differential was considered and the appropriate diagnostics were ordered therefore with a disposition determination in line with the most likely underlying cause.   Clinical Tests:   Lab Tests: Ordered and Reviewed  Radiological Study: Ordered and Reviewed                      Clinical Impression:   Final diagnoses:  [M54.50] Acute bilateral low back pain without sciatica (Primary)  [S39.012A] Back strain, initial encounter          ED Disposition Condition    Discharge Stable        ED Prescriptions     Medication Sig Dispense Start Date End Date Auth. Provider    cyclobenzaprine (FLEXERIL) 10 MG tablet Take 1 tablet (10 mg total) by mouth 3 (three) times daily as needed for Muscle spasms. 15 tablet 8/5/2022 8/10/2022 Beni Davis MD    acetaminophen (TYLENOL) 500 MG tablet Take 2 tablets (1,000 mg total) by mouth every 6 (six) hours as needed. 50 tablet 8/5/2022  Beni Davis MD        Follow-up Information     Follow up With Specialties Details Why Contact [facility] - Emergency Dept Emergency Medicine Go to  As needed, For a follow up visit about today [address] [facility] [id number] [phone]           Beni Davis MD  08/06/22 [id number]

## 2022-08-05 NOTE — DISCHARGE INSTRUCTIONS
Mr. Hazel,    Thank you for letting me care for you today! It was nice meeting you, and I hope you feel better soon.   If you would like access to your chart and what was done today please utilize the [facility].   Please come back to [facility] for all of your future medical needs.    Our goal in the emergency department is to always give you outstanding care and exceptional service. You may receive a survey by mail or e-mail in the next week regarding your experience in our ED. We would greatly appreciate you completing and returning the survey. Your feedback provides us with a way to recognize our staff who give very good care and it helps us learn how to improve when your experience was below our aspiration of excellence.     Sincerely,    Beni Davis MD  Board Certified Emergency Physician

## 2022-08-14 ENCOUNTER — HOSPITAL ENCOUNTER (EMERGENCY)
Facility: HOSPITAL | Age: 57
Discharge: HOME | End: 2022-08-14
Attending: EMERGENCY MEDICINE
Payer: MEDICAID

## 2022-08-14 VITALS
SYSTOLIC BLOOD PRESSURE: 114 MMHG | HEART RATE: 87 BPM | RESPIRATION RATE: 20 BRPM | OXYGEN SATURATION: 100 % | TEMPERATURE: 98 F | DIASTOLIC BLOOD PRESSURE: 74 MMHG

## 2022-08-14 DIAGNOSIS — M51.26 LUMBAR HERNIATED DISC: Primary | ICD-10-CM

## 2022-08-14 PROCEDURE — 96372 THER/PROPH/DIAG INJ SC/IM: CPT | Performed by: EMERGENCY MEDICINE

## 2022-08-14 PROCEDURE — 63600175 PHARM REV CODE 636 W HCPCS: Performed by: EMERGENCY MEDICINE

## 2022-08-14 PROCEDURE — 25000003 PHARM REV CODE 250: Performed by: EMERGENCY MEDICINE

## 2022-08-14 PROCEDURE — 99285 EMERGENCY DEPT VISIT HI MDM: CPT | Mod: 25

## 2022-08-14 RX ORDER — TRAMADOL HYDROCHLORIDE 50 MG/1
50 TABLET ORAL EVERY 6 HOURS PRN
Qty: 12 TABLET | Refills: 0 | Status: SHIPPED | OUTPATIENT
Start: 2022-08-14

## 2022-08-14 RX ORDER — CYCLOBENZAPRINE HCL 10 MG
10 TABLET ORAL 3 TIMES DAILY PRN
Qty: 15 TABLET | Refills: 0 | Status: SHIPPED | OUTPATIENT
Start: 2022-08-14 | End: 2022-08-19

## 2022-08-14 RX ORDER — KETOROLAC TROMETHAMINE 30 MG/ML
15 INJECTION, SOLUTION INTRAMUSCULAR; INTRAVENOUS
Status: COMPLETED | OUTPATIENT
Start: 2022-08-14 | End: 2022-08-14

## 2022-08-14 RX ORDER — PREDNISONE 20 MG/1
20 TABLET ORAL DAILY
Qty: 5 TABLET | Refills: 0 | Status: SHIPPED | OUTPATIENT
Start: 2022-08-14 | End: 2022-08-19

## 2022-08-14 RX ORDER — METHOCARBAMOL 500 MG/1
500 TABLET, FILM COATED ORAL
Status: COMPLETED | OUTPATIENT
Start: 2022-08-14 | End: 2022-08-14

## 2022-08-14 RX ADMIN — KETOROLAC TROMETHAMINE 15 MG: 30 INJECTION, SOLUTION INTRAMUSCULAR at 10:08

## 2022-08-14 RX ADMIN — METHOCARBAMOL 500 MG: 500 TABLET ORAL at 10:08

## 2022-08-14 NOTE — ED NOTES
APPEARANCE: Awake, alert, & oriented. No acute distress.  CARDIAC: Normal rate and rhythm. Denies chest pain.     RESPIRATORY: Respirations are even and unlabored no obvious signs of distress. No accessory muscle use. Breath sounds clear bilaterally throughout chest.  PERIPHERAL VASCULAR: peripheral pulses present. Normal cap refill. No edema.   GASTRO: soft, no tenderness, no abdominal distention.  MUSC: Full ROM. No bony tenderness or soft tissue tenderness except lower back. No obvious deformity.  SKIN: Skin is warm, dry, and intact. Normal skin turgor and color.  NEURO: Equal strength bilaterally. Patrice coma scale: Eye Response-4, Motor Response-6, Verbal Response-5. Total=15. Clear speech. No neurological abnormalities.   EENT: No c/o vision or hearing difficulties. Oropharynx clear.

## 2022-08-14 NOTE — ED PROVIDER NOTES
Encounter Date: 8/14/2022       History     Chief Complaint   Patient presents with    Back Pain     Lower back pain x 1 week, no trauma, pain with sitting. No numbness or tingling.      57-year-old male history hypertension smoker presents with a persistent low back pain for over a week patient was seen in the emergency room a week ago x-ray of the lumbar area showed some disc disease some loss of height patient denies any cauda equina syndrome symptoms.  The patient's symptoms persist besides or in spite of conservative treatment lower back pain pain with walking he denies any incontinence or retention no paresthesias in the lower extremities or weakness.  No prior injuries reported according to him no other constitutional symptoms reported no urinary symptoms no history of kidney stones no hematuria no significant weight loss.        Review of patient's allergies indicates:  No Known Allergies  No past medical history on file.  No past surgical history on file.  Family History   Problem Relation Age of Onset    Hypertension Mother      Social History     Tobacco Use    Smoking status: Current Every Day Smoker     Packs/day: 1.00     Types: Cigarettes    Smokeless tobacco: Never Used   Substance Use Topics    Alcohol use: No    Drug use: No     Review of Systems   Musculoskeletal: Positive for back pain.   All other systems reviewed and are negative.      Physical Exam     Initial Vitals [08/14/22 1003]   BP Pulse Resp Temp SpO2   114/74 87 20 97.9 °F (36.6 °C) 100 %      MAP       --         Physical Exam    Nursing note and vitals reviewed.  Constitutional: He appears well-developed and well-nourished.   HENT:   Head: Normocephalic and atraumatic.   Neck: Neck supple.   Normal range of motion.  Cardiovascular: Normal rate, regular rhythm and normal heart sounds.   Pulmonary/Chest: Breath sounds normal.   Abdominal: Abdomen is soft. Bowel sounds are normal.   Musculoskeletal:         General: Tenderness  present.      Cervical back: Normal range of motion and neck supple.      Comments: Mild tender to palpation in a paraspinous and lumbar area around L4-L5 pain reproducible with lumbar flexion extension and rotation.  Neurologically intact there is no neurological deficits seen at this time there is no bony tenderness.     Neurological: He is alert and oriented to person, place, and time. He has normal strength and normal reflexes. No sensory deficit.   Skin: Skin is warm. Capillary refill takes less than 2 seconds.         ED Course   Procedures  Labs Reviewed - No data to display       Imaging Results          CT Lumbar Spine Without Contrast (Final result)  Result time 08/14/22 11:00:46    Final result by Duke Green MD (08/14/22 11:00:46)                 Impression:      1. No evidence of acute fracture or traumatic subluxation.  2. Degenerative findings, as discussed.  3. Grade 1 anterolisthesis of L4 on L5 related to chronic bilateral L5 pars defects.  4. Additional details, as provided in the body of report.      Electronically signed by: Duke Green  Date:    08/14/2022  Time:    11:00             Narrative:    EXAMINATION:  CT LUMBAR SPINE WITHOUT CONTRAST    CLINICAL HISTORY:  Low back pain, symptoms persist with > 6wks conservative treatment;    TECHNIQUE:  Low-dose axial, sagittal and coronal reformations are obtained through the lumbar spine.  Contrast was not administered.    COMPARISON:  Lumbar spine radiograph performed 08/05/2022.    FINDINGS:    Fractures: No acute lumbar spine fracture is present.  Chronic appearing bilateral L5 pars defects.    Alignment: Grade 1 anterolisthesis of L5 on S1 related to chronic bilateral L5 pars defects.  Minimal retrolisthesis of L4 on L5.    Discs: Degenerative findings, discussed further below.  Vacuum disc phenomenon at L5-S1.    Vertebral bodies: Relatively modest degenerative endplate change.    Paraspinal soft tissues: Unremarkable.    Although CT  is inferior to MRI in the evaluation of spinal canal and foraminal soft tissues, level-wise findings are as follows:    L1-L2: There is no significant spinal canal or foraminal stenosis.    L2-L3: There is no significant spinal canal or foraminal stenosis.    L3-L4: Shallow diffuse disc bulge and mild facet arthropathy with ligamentum flavum thickening with suggested mild thecal sac deformity.  Mild inferior foraminal narrowing without definite exiting nerve root impingement.  Preserved perineural fat.    L4-L5: Retrolisthesis of L4-L5 and shallow diffuse disc bulge with facet arthropathy ligamentum flavum thickening contribute to relatively mild thecal sac deformity.  Mild-to-moderate bilateral foraminal narrowing without definite exiting nerve root impingement.    L5-S1: Anterolisthesis of L5 on S1 results in uncovering of the diffusely bulging disc, relatively mild ventral thecal sac deformity.  No significant canal stenosis.  Mild-to-moderate bilateral foraminal narrowing without definite exiting nerve root impingement.    Imaged sacroiliac joints: Unremarkable.    Imaged abdomen, pelvis, and retroperitoneum: Aortoiliac atherosclerosis.  Suggested colonic diverticulosis.  Exophytic simple cyst upper pole right kidney.                                 Medications   ketorolac injection 15 mg (15 mg Intramuscular Given 8/14/22 1015)   methocarbamoL tablet 500 mg (500 mg Oral Given 8/14/22 1015)                 ED Course as of 08/14/22 1741   Sun Aug 14, 2022   1124 Patient with disc bulging/herniated will need NS follow up. I have discussed with the patient and further evaluation will be required including MRI, he endorses understanding. [CG]      ED Course User Index  [CG] Joss Peña MD             Clinical Impression:   Final diagnoses:  [M51.26] Lumbar herniated disc (Primary)          ED Disposition Condition    Discharge Stable        ED Prescriptions     Medication Sig Dispense Start Date End Date  Auth. Provider    traMADoL (ULTRAM) 50 mg tablet Take 1 tablet (50 mg total) by mouth every 6 (six) hours as needed for Pain. 12 tablet 8/14/2022  Joss Peña MD    cyclobenzaprine (FLEXERIL) 10 MG tablet Take 1 tablet (10 mg total) by mouth 3 (three) times daily as needed for Muscle spasms. 15 tablet 8/14/2022 8/19/2022 Joss Peña MD    predniSONE (DELTASONE) 20 MG tablet Take 1 tablet (20 mg total) by mouth once daily. for 5 days 5 tablet 8/14/2022 8/19/2022 Joss Peña MD        Follow-up Information    None          Joss Peña MD  08/14/22 9113

## 2022-12-15 ENCOUNTER — OFFICE VISIT (OUTPATIENT)
Dept: NEUROSURGERY | Facility: CLINIC | Age: 57
End: 2022-12-15
Payer: MEDICAID

## 2022-12-15 VITALS
HEIGHT: 66 IN | SYSTOLIC BLOOD PRESSURE: 114 MMHG | DIASTOLIC BLOOD PRESSURE: 87 MMHG | WEIGHT: 133.81 LBS | BODY MASS INDEX: 21.51 KG/M2 | HEART RATE: 86 BPM

## 2022-12-15 DIAGNOSIS — M43.06 LUMBAR SPONDYLOLYSIS: Primary | ICD-10-CM

## 2022-12-15 DIAGNOSIS — M43.16 SPONDYLOLISTHESIS, LUMBAR REGION: ICD-10-CM

## 2022-12-15 DIAGNOSIS — M47.816 SPONDYLOSIS OF LUMBAR REGION WITHOUT MYELOPATHY OR RADICULOPATHY: ICD-10-CM

## 2022-12-15 DIAGNOSIS — M51.36 DDD (DEGENERATIVE DISC DISEASE), LUMBAR: ICD-10-CM

## 2022-12-15 PROCEDURE — 3079F DIAST BP 80-89 MM HG: CPT | Mod: CPTII,,, | Performed by: PHYSICIAN ASSISTANT

## 2022-12-15 PROCEDURE — 1160F PR REVIEW ALL MEDS BY PRESCRIBER/CLIN PHARMACIST DOCUMENTED: ICD-10-PCS | Mod: CPTII,,, | Performed by: PHYSICIAN ASSISTANT

## 2022-12-15 PROCEDURE — 3074F PR MOST RECENT SYSTOLIC BLOOD PRESSURE < 130 MM HG: ICD-10-PCS | Mod: CPTII,,, | Performed by: PHYSICIAN ASSISTANT

## 2022-12-15 PROCEDURE — 1159F MED LIST DOCD IN RCRD: CPT | Mod: CPTII,,, | Performed by: PHYSICIAN ASSISTANT

## 2022-12-15 PROCEDURE — 99204 OFFICE O/P NEW MOD 45 MIN: CPT | Mod: S$PBB,,, | Performed by: PHYSICIAN ASSISTANT

## 2022-12-15 PROCEDURE — 99999 PR PBB SHADOW E&M-EST. PATIENT-LVL V: CPT | Mod: PBBFAC,,, | Performed by: PHYSICIAN ASSISTANT

## 2022-12-15 PROCEDURE — 3008F BODY MASS INDEX DOCD: CPT | Mod: CPTII,,, | Performed by: PHYSICIAN ASSISTANT

## 2022-12-15 PROCEDURE — 1160F RVW MEDS BY RX/DR IN RCRD: CPT | Mod: CPTII,,, | Performed by: PHYSICIAN ASSISTANT

## 2022-12-15 PROCEDURE — 3079F PR MOST RECENT DIASTOLIC BLOOD PRESSURE 80-89 MM HG: ICD-10-PCS | Mod: CPTII,,, | Performed by: PHYSICIAN ASSISTANT

## 2022-12-15 PROCEDURE — 99215 OFFICE O/P EST HI 40 MIN: CPT | Mod: PBBFAC,PN | Performed by: PHYSICIAN ASSISTANT

## 2022-12-15 PROCEDURE — 3074F SYST BP LT 130 MM HG: CPT | Mod: CPTII,,, | Performed by: PHYSICIAN ASSISTANT

## 2022-12-15 PROCEDURE — 99204 PR OFFICE/OUTPT VISIT, NEW, LEVL IV, 45-59 MIN: ICD-10-PCS | Mod: S$PBB,,, | Performed by: PHYSICIAN ASSISTANT

## 2022-12-15 PROCEDURE — 99999 PR PBB SHADOW E&M-EST. PATIENT-LVL V: ICD-10-PCS | Mod: PBBFAC,,, | Performed by: PHYSICIAN ASSISTANT

## 2022-12-15 PROCEDURE — 1159F PR MEDICATION LIST DOCUMENTED IN MEDICAL RECORD: ICD-10-PCS | Mod: CPTII,,, | Performed by: PHYSICIAN ASSISTANT

## 2022-12-15 PROCEDURE — 3008F PR BODY MASS INDEX (BMI) DOCUMENTED: ICD-10-PCS | Mod: CPTII,,, | Performed by: PHYSICIAN ASSISTANT

## 2022-12-15 NOTE — PROGRESS NOTES
Subjective:     Patient ID:  Devyn Hazel is a 57 y.o. male.    Joe    Chief Complaint:  No complaints    HPI    Devyn Hazel is a 57 y.o. male who presents with the above CC.  Patient states about 3-4 years ago he had back pain and went to a chiropractor and the pain resolved.  A few months ago he started having pain in the left low back region that eventually went away.  He currently does not have any back pain.  No leg pain or paresthesias.      He denies any intense sports or any falls trauma or accidents.    Patient has not had PT or ESIs.  No spine surgery.  Patient is currently not taking any medication for this.    Patient denies any recent accidents or trauma, no saddle anesthesias, and no bowel or bladder incontinence.      Review of Systems:    Review of Systems   Constitutional:  Negative for chills, diaphoresis, fever, malaise/fatigue and weight loss.   HENT:  Negative for congestion, ear discharge, ear pain, hearing loss, nosebleeds, sinus pain, sore throat and tinnitus.    Eyes:  Negative for blurred vision, double vision, photophobia, pain, discharge and redness.   Respiratory:  Negative for cough, hemoptysis, sputum production, shortness of breath, wheezing and stridor.    Cardiovascular:  Negative for chest pain, palpitations, orthopnea, leg swelling and PND.   Gastrointestinal:  Positive for constipation. Negative for abdominal pain, blood in stool, diarrhea, heartburn, melena, nausea and vomiting.   Genitourinary:  Negative for dysuria, flank pain, frequency, hematuria and urgency.   Musculoskeletal:  Negative for back pain, falls, joint pain, myalgias and neck pain.   Skin:  Positive for itching and rash.   Neurological:  Negative for dizziness, tingling, tremors, sensory change, speech change, seizures, loss of consciousness, weakness and headaches.   Endo/Heme/Allergies:  Negative for environmental allergies and polydipsia. Does not bruise/bleed easily.   Psychiatric/Behavioral:  Negative  for depression, hallucinations, memory loss and substance abuse. The patient is not nervous/anxious and does not have insomnia.        History reviewed. No pertinent past medical history.  History reviewed. No pertinent surgical history.  Current Outpatient Medications on File Prior to Visit   Medication Sig Dispense Refill    pravastatin (PRAVACHOL) 20 MG tablet Take 20 mg by mouth nightly.      acetaminophen (TYLENOL EXTRA STRENGTH) 500 MG tablet Take 500 mg by mouth every 6 (six) hours as needed for Pain.       acetaminophen (TYLENOL) 500 MG tablet Take 2 tablets (1,000 mg total) by mouth every 6 (six) hours as needed. (Patient not taking: Reported on 12/15/2022) 50 tablet 0    amoxicillin (AMOXIL) 500 MG capsule Take 500 mg by mouth every 8 (eight) hours.      azithromycin (ZITHROMAX Z-GHASSAN) 250 MG tablet Take 2 tablets (500 mg) on  Day 1,  followed by 1 tablet (250 mg) once daily on Days 2 through 5. (Patient not taking: Reported on 12/15/2022) 5 tablet 0    clotrimazole (LOTRIMIN) 1 % cream Apply to affected area 2 times daily (Patient not taking: Reported on 12/15/2022) 15 g 0    clotrimazole-betamethasone 1-0.05% (LOTRISONE) cream Apply topically.      diclofenac (VOLTAREN) 75 MG EC tablet Take 1 tablet (75 mg total) by mouth 2 (two) times daily. (Patient not taking: Reported on 12/15/2022) 60 tablet 1    ergocalciferol (ERGOCALCIFEROL) 50,000 unit Cap Take 1 capsule (50,000 Units total) by mouth every 7 days. (Patient not taking: Reported on 12/15/2022) 10 capsule 0    HYDROcodone-acetaminophen (NORCO) 5-325 mg per tablet Take 1 tablet by mouth every 4 (four) hours as needed. (Patient not taking: Reported on 12/15/2022) 18 tablet 0    lidocaine HCL 2% (XYLOCAINE) 2 % jelly Apply topically as needed. (Patient not taking: Reported on 12/15/2022) 5 mL 0    mupirocin (BACTROBAN) 2 % ointment Apply topically 3 (three) times daily. (Patient not taking: Reported on 12/15/2022) 1 g 0    naproxen (NAPROSYN) 500 MG  "tablet Take 1 tablet (500 mg total) by mouth 2 (two) times daily as needed (pain). (Patient not taking: Reported on 12/15/2022) 30 tablet 0    traMADoL (ULTRAM) 50 mg tablet Take 1 tablet (50 mg total) by mouth every 6 (six) hours as needed for Pain. (Patient not taking: Reported on 12/15/2022) 12 tablet 0    triamcinolone acetonide 0.5% (KENALOG) 0.5 % Crea Apply to tops of feet twice daily      zinc acetate 50 mg (zinc) Cap Take one po bid (Patient not taking: Reported on 12/15/2022) 60 each 1     No current facility-administered medications on file prior to visit.     Review of patient's allergies indicates:  No Known Allergies  Social History     Socioeconomic History    Marital status:    Tobacco Use    Smoking status: Every Day     Packs/day: 1.00     Types: Cigarettes    Smokeless tobacco: Never   Substance and Sexual Activity    Alcohol use: No    Drug use: No    Sexual activity: Yes     Partners: Female     Family History   Problem Relation Age of Onset    Hypertension Mother        Objective:      Vitals:    12/15/22 0932   BP: 114/87   Pulse: 86   Weight: 60.7 kg (133 lb 13.1 oz)   Height: 5' 6" (1.676 m)   PainSc: 0-No pain         Physical Exam:      General:  Devyn Hazel is well-developed, well-nourished, appears stated age, in no acute distress, alert and oriented to person, place, and time.    Pulmonary/Chest:  Respiratory effort normal  Abdominal: Exhibits no distension  Psychiatric:  Normal mood and affect.  Behavior is normal.  Judgement and thought content normal      Musculoskeletal:    Lumbar ROM:   No pain in lumbar flexion, extension, left lateral bending, and right lateral bending.    Lumbar Spine Inspection:  Normal with no surgical scars and no visible rashes.    Lumbar Spine Palpation:  No tenderness to low back palpation.    SI Joint Palpation:  No tenderness to SI Joint palpation.    Straight Leg Raise:  Negative right and left SLR.      Neurological:     Muscle strength " against resistance:     Right Left   Hip flexion  5 / 5 5 / 5   Hip extension 5 / 5 5 / 5   Hip abduction 5 / 5 5 / 5   Hip adduction  5 / 5 5 / 5   Knee extension  5 / 5 5 / 5   Knee flexion 5 / 5 5 / 5   Dorsiflexion  5 / 5 5 / 5   EHL  5 / 5 5 / 5   Plantar flexion  5 / 5 5 / 5   Inversion of the feet 5 / 5 5 / 5   Eversion of the feet  5 / 5 5 / 5       Reflexes:     Right Left   Patellar 2+ 2+   Achilles 2+ 2+     Clonus:  Negative bilaterally    On gross examination of the bilateral upper extremities, patient has full painfree ROM with no signs of clubbing, cyanosis, edema, or weakness.       XRAY/CT lumbar spine Interpretation:     Lumbar spine xrays were personally reviewed today.  No fractures.  No movement on flexion and extension.  Grade 1 spondylolisthesis at L5-S1 with pars defects.      CT lumbar spine was personally reviewed today from August 2022.  Grade 1 spondylolisthesis with spondylolysis at L5-S1.  Degenerative disc disease at L5-S1.      Assessment:          1. Lumbar spondylolysis    2. Spondylosis of lumbar region without myelopathy or radiculopathy    3. DDD (degenerative disc disease), lumbar    4. Spondylolisthesis, lumbar region            Plan:             Grade 1 spondylolisthesis with spondylolysis at L5-S1.  Degenerative disc disease at L5-S1.    -patient currently without back pain.  If pain persist he will call back to the clinic and we will go forward at that point.  Could consider physical therapy or spine injections   -follow-up p.r.n.      Follow-Up:  Follow up if symptoms worsen or fail to improve. If there are any questions prior to this, the patient was instructed to contact the office.       Stefany Collazo, [facility], PA-C  Neurosurgery  [facility]  12/15/2022

## 2023-01-29 ENCOUNTER — OFFICE VISIT (OUTPATIENT)
Dept: URGENT CARE | Facility: CLINIC | Age: 58
End: 2023-01-29
Payer: MEDICAID

## 2023-01-29 VITALS
OXYGEN SATURATION: 98 % | DIASTOLIC BLOOD PRESSURE: 88 MMHG | WEIGHT: 133 LBS | TEMPERATURE: 98 F | SYSTOLIC BLOOD PRESSURE: 131 MMHG | BODY MASS INDEX: 21.38 KG/M2 | HEART RATE: 90 BPM | RESPIRATION RATE: 18 BRPM | HEIGHT: 66 IN

## 2023-01-29 DIAGNOSIS — K04.7 DENTAL INFECTION: Primary | ICD-10-CM

## 2023-01-29 DIAGNOSIS — K08.89 PAIN, DENTAL: ICD-10-CM

## 2023-01-29 PROCEDURE — 99213 OFFICE O/P EST LOW 20 MIN: CPT | Mod: S$GLB,,, | Performed by: NURSE PRACTITIONER

## 2023-01-29 PROCEDURE — 3008F PR BODY MASS INDEX (BMI) DOCUMENTED: ICD-10-PCS | Mod: CPTII,S$GLB,, | Performed by: NURSE PRACTITIONER

## 2023-01-29 PROCEDURE — 1159F PR MEDICATION LIST DOCUMENTED IN MEDICAL RECORD: ICD-10-PCS | Mod: CPTII,S$GLB,, | Performed by: NURSE PRACTITIONER

## 2023-01-29 PROCEDURE — 1160F PR REVIEW ALL MEDS BY PRESCRIBER/CLIN PHARMACIST DOCUMENTED: ICD-10-PCS | Mod: CPTII,S$GLB,, | Performed by: NURSE PRACTITIONER

## 2023-01-29 PROCEDURE — 3079F DIAST BP 80-89 MM HG: CPT | Mod: CPTII,S$GLB,, | Performed by: NURSE PRACTITIONER

## 2023-01-29 PROCEDURE — 3008F BODY MASS INDEX DOCD: CPT | Mod: CPTII,S$GLB,, | Performed by: NURSE PRACTITIONER

## 2023-01-29 PROCEDURE — 3075F PR MOST RECENT SYSTOLIC BLOOD PRESS GE 130-139MM HG: ICD-10-PCS | Mod: CPTII,S$GLB,, | Performed by: NURSE PRACTITIONER

## 2023-01-29 PROCEDURE — 3079F PR MOST RECENT DIASTOLIC BLOOD PRESSURE 80-89 MM HG: ICD-10-PCS | Mod: CPTII,S$GLB,, | Performed by: NURSE PRACTITIONER

## 2023-01-29 PROCEDURE — 99213 PR OFFICE/OUTPT VISIT, EST, LEVL III, 20-29 MIN: ICD-10-PCS | Mod: S$GLB,,, | Performed by: NURSE PRACTITIONER

## 2023-01-29 PROCEDURE — 3075F SYST BP GE 130 - 139MM HG: CPT | Mod: CPTII,S$GLB,, | Performed by: NURSE PRACTITIONER

## 2023-01-29 PROCEDURE — 1159F MED LIST DOCD IN RCRD: CPT | Mod: CPTII,S$GLB,, | Performed by: NURSE PRACTITIONER

## 2023-01-29 PROCEDURE — 1160F RVW MEDS BY RX/DR IN RCRD: CPT | Mod: CPTII,S$GLB,, | Performed by: NURSE PRACTITIONER

## 2023-01-29 RX ORDER — MELOXICAM 15 MG/1
15 TABLET ORAL DAILY PRN
COMMUNITY
Start: 2023-01-12

## 2023-01-29 RX ORDER — AMOXICILLIN AND CLAVULANATE POTASSIUM 875; 125 MG/1; MG/1
1 TABLET, FILM COATED ORAL EVERY 12 HOURS
Qty: 20 TABLET | Refills: 0 | Status: SHIPPED | OUTPATIENT
Start: 2023-01-29 | End: 2023-02-08

## 2023-01-29 RX ORDER — SULFAMETHOXAZOLE AND TRIMETHOPRIM 800; 160 MG/1; MG/1
1 TABLET ORAL 2 TIMES DAILY
COMMUNITY
Start: 2022-12-16

## 2023-01-29 RX ORDER — ATORVASTATIN CALCIUM 20 MG/1
20 TABLET, FILM COATED ORAL NIGHTLY
COMMUNITY
Start: 2023-01-17

## 2023-01-29 RX ORDER — SILDENAFIL 100 MG/1
50-100 TABLET, FILM COATED ORAL DAILY PRN
COMMUNITY
Start: 2023-01-13

## 2023-01-29 RX ORDER — TIZANIDINE 4 MG/1
4 TABLET ORAL 2 TIMES DAILY PRN
COMMUNITY
Start: 2023-01-12

## 2023-01-29 RX ORDER — IBUPROFEN 600 MG/1
600 TABLET ORAL EVERY 8 HOURS PRN
Qty: 20 TABLET | Refills: 0 | OUTPATIENT
Start: 2023-01-29 | End: 2023-10-14

## 2023-01-29 NOTE — PATIENT INSTRUCTIONS
PLEASE READ YOUR DISCHARGE INSTRUCTIONS ENTIRELY AS IT CONTAINS IMPORTANT INFORMATION.      Use the mouthwash twice daily    Take the antibiotics to completion    Please see a dentist ASAP for further treatment as the infection can spread to your jaw bone. Ideally in 24-48 hours.    Please return or see your primary care doctor if you develop new or worsening symptoms.     You must understand that you have received an Urgent Care treatment only and that you may be released before all of your medical problems are known or treated.

## 2023-01-29 NOTE — PROGRESS NOTES
"Subjective:       Patient ID: Devyn Hazel is a 57 y.o. male.    Vitals:  height is 5' 6" (1.676 m) and weight is 60.3 kg (133 lb). His oral temperature is 97.8 °F (36.6 °C). His blood pressure is 131/88 and his pulse is 90. His respiration is 18 and oxygen saturation is 98%.     Chief Complaint: Dental Pain    57 yr old male came in with complaints of dental pain. The pain is on the right side of his mouth. His symptoms started last nightdenies fever, body aches or chills, denies cough, wheezing or shortness of breath, denies nausea, vomiting, diarrhea or abdominal pain, denies chest pain or dizziness positional lightheadedness, denies sore throat or trouble swallowing, denies loss of taste or smell, or any other symptoms        Dental Pain   This is a new problem. The current episode started yesterday. The problem occurs constantly. The problem has been gradually worsening. The pain is at a severity of 8/10. The pain is moderate. Associated symptoms include difficulty swallowing and facial pain. Pertinent negatives include no fever, oral bleeding, sinus pressure or thermal sensitivity. He has tried nothing for the symptoms.     Constitution: Negative for fever.   HENT:  Negative for sinus pressure.      Objective:      Physical Exam   Constitutional: He is oriented to person, place, and time. He appears well-developed. He is cooperative.  Non-toxic appearance. He does not appear ill. No distress.   HENT:   Head: Normocephalic and atraumatic.   Ears:   Right Ear: Hearing, tympanic membrane, external ear and ear canal normal.   Left Ear: Hearing, tympanic membrane, external ear and ear canal normal.   Nose: Nose normal. No mucosal edema, rhinorrhea or nasal deformity. No epistaxis. Right sinus exhibits no maxillary sinus tenderness and no frontal sinus tenderness. Left sinus exhibits no maxillary sinus tenderness and no frontal sinus tenderness.   Mouth/Throat: Uvula is midline, oropharynx is clear and moist and " mucous membranes are normal. No trismus in the jaw. Abnormal dentition. Dental caries present. No uvula swelling. No oropharyngeal exudate, posterior oropharyngeal edema, posterior oropharyngeal erythema, tonsillar abscesses or cobblestoning.       No oropharyngeal erythema, edema or exudate noted, uvula midline, no Lyle's or trismus, able to tolerate secretions, no active drainage or discharge noted, erythema and tenderness with minimal swelling to point of pain to gum line, no visualized abscess noted      Comments: No oropharyngeal erythema, edema or exudate noted, uvula midline, no Lyle's or trismus, able to tolerate secretions, no active drainage or discharge noted, erythema and tenderness with minimal swelling to point of pain to gum line, no visualized abscess noted  Eyes: Conjunctivae and lids are normal. No scleral icterus.   Neck: Trachea normal and phonation normal. Neck supple. No edema present. No erythema present. No neck rigidity present.   Cardiovascular: Normal rate, regular rhythm, normal heart sounds and normal pulses.   Pulmonary/Chest: Effort normal and breath sounds normal. No respiratory distress. He has no decreased breath sounds. He has no rhonchi.   Abdominal: Normal appearance.   Musculoskeletal: Normal range of motion.         General: No deformity. Normal range of motion.   Neurological: He is alert and oriented to person, place, and time. He exhibits normal muscle tone. Coordination normal.   Skin: Skin is warm, dry, intact, not diaphoretic and not pale.   Psychiatric: His speech is normal and behavior is normal. Judgment and thought content normal.   Nursing note and vitals reviewed.        Patient in no acute distress.  Vitals reassuring.  Discussed results/diagnosis/plan in depth with patient in clinic. Strict precautions given to patient to monitor for worsening signs and symptoms. Advised to follow up with primary.All questions answered. Strict ER precautions given. If your  symptoms worsens or fail to improve you should go to the Emergency Room. Discharge and follow-up instructions given verbally/printed. Discharge and follow-up instructions discussed with the patient who expressed understanding and willingness to comply with my recommendations.Patient voiced understanding and in agreement with current treatment plan.     Please be advised this text was dictated with [employer] software and may contain errors due to translation.    Assessment:       1. Dental infection    2. Pain, dental          Plan:         Dental infection  -     amoxicillin-clavulanate 875-125mg (AUGMENTIN) 875-125 mg per tablet; Take 1 tablet by mouth every 12 (twelve) hours. for 10 days  Dispense: 20 tablet; Refill: 0    Pain, dental  -     ibuprofen (ADVIL,MOTRIN) 600 MG tablet; Take 1 tablet (600 mg total) by mouth every 8 (eight) hours as needed for Pain.  Dispense: 20 tablet; Refill: 0           Medical Decision Making:   Urgent Care Management:  Patient in no acute distress.  Vitals reassuring.  On exam, patient is nontoxic appearing and afebrile.  Physical examination consistent with likely developing dental infection.  No visualized abscess noted at this time.  Able to tolerate secretions.  No oropharyngeal erythema, edema noted.  No facial swelling noted.  Medication prescribed and over-the-counter medication discussed with patient in detail.  I reiterated the importance of further evaluation follow-up with dentist as soon as possible.Patient voiced understanding and in agreement with current treatment plan.           Patient Instructions   PLEASE READ YOUR DISCHARGE INSTRUCTIONS ENTIRELY AS IT CONTAINS IMPORTANT INFORMATION.      Use the mouthwash twice daily    Take the antibiotics to completion    Please see a dentist ASAP for further treatment as the infection can spread to your jaw bone. Ideally in 24-48 hours.    Please return or see your primary care doctor if you develop new or worsening symptoms.      You must understand that you have received an Urgent Care treatment only and that you may be released before all of your medical problems are known or treated.

## 2023-03-05 ENCOUNTER — HOSPITAL ENCOUNTER (EMERGENCY)
Facility: HOSPITAL | Age: 58
Discharge: HOME | End: 2023-03-05
Attending: EMERGENCY MEDICINE
Payer: MEDICAID

## 2023-03-05 VITALS
SYSTOLIC BLOOD PRESSURE: 131 MMHG | HEART RATE: 77 BPM | OXYGEN SATURATION: 100 % | DIASTOLIC BLOOD PRESSURE: 74 MMHG | BODY MASS INDEX: 24.11 KG/M2 | TEMPERATURE: 98 F | HEIGHT: 66 IN | WEIGHT: 150 LBS | RESPIRATION RATE: 18 BRPM

## 2023-03-05 DIAGNOSIS — R21 RASH OF MULTIPLE DIGITS OF BOTH HANDS: Primary | ICD-10-CM

## 2023-03-05 PROCEDURE — 99282 EMERGENCY DEPT VISIT SF MDM: CPT

## 2023-03-05 RX ORDER — DOXYLAMINE SUCCINATE 25 MG
TABLET ORAL 2 TIMES DAILY
Qty: 30 G | Refills: 1 | Status: SHIPPED | OUTPATIENT
Start: 2023-03-05

## 2023-03-05 NOTE — ED PROVIDER NOTES
Encounter Date: 3/5/2023    SCRIBE #1 NOTE: I, Anum Harden, am scribing for, and in the presence of,  Juan Khan MD. I have scribed the following portions of the note - Other sections scribed: HPI, ROS, Physical Exam, MDM.     History     Chief Complaint   Patient presents with    Hand Pain     Pt to ED with c/o hand pain x 1 year. States he has cracks and itching to palms of hands. Has seen an MD previously and given medications but has not helped.      Patient is a 57 year old male with no known past medical history presents to the ED due to a hand rash. Patient reports having a chronic bilateral hand rash that started one year ago. He states the rash and pain has progressively worsened over the last few months. He also reports itching to the palms of his hands and painful skin cracks. He states he saw an MD and was prescribed a cream, but had no relief. Patient unsure of medication prescribed.  Patient has a dermatologist appointment next month but states he can't wait that long due to the pain.           The history is provided by the patient.   Review of patient's allergies indicates:  No Known Allergies  No past medical history on file.  No past surgical history on file.  Family History   Problem Relation Age of Onset    Hypertension Mother      Social History     Tobacco Use    Smoking status: Every Day     Packs/day: 1.00     Types: Cigarettes    Smokeless tobacco: Never   Substance Use Topics    Alcohol use: No    Drug use: No     Review of Systems   Constitutional:  Negative for fever.   HENT:  Negative for sore throat.    Respiratory:  Negative for shortness of breath.    Cardiovascular:  Negative for chest pain.   Gastrointestinal:  Negative for nausea.   Genitourinary:  Negative for dysuria.   Musculoskeletal:  Negative for back pain.   Skin:  Positive for rash (bilateral hand rash).   Neurological:  Negative for weakness.   Hematological:  Does not bruise/bleed easily.     Physical  Exam     Initial Vitals [03/05/23 0753]   BP Pulse Resp Temp SpO2   131/74 77 18 97.8 °F (36.6 °C) 100 %      MAP       --         Physical Exam    Nursing note and vitals reviewed.  Constitutional: He appears well-developed and well-nourished. No distress.   HENT:   Head: Normocephalic and atraumatic.   Mouth/Throat: Oropharynx is clear and moist.   Eyes: Conjunctivae are normal. Pupils are equal, round, and reactive to light.   Neck: Neck supple.   Normal range of motion.  Cardiovascular:  Normal rate, regular rhythm and normal heart sounds.           Pulmonary/Chest: Breath sounds normal. No respiratory distress.   Abdominal: Abdomen is soft. Bowel sounds are normal. He exhibits no distension. There is no abdominal tenderness.   Musculoskeletal:         General: No tenderness or edema. Normal range of motion.      Cervical back: Normal range of motion and neck supple.      Comments: Thickened skin on all fingers and on both hands with some fissuring and erythema.  No pustules or vesicles.     Lymphadenopathy:     He has no cervical adenopathy.   Neurological: He is alert and oriented to person, place, and time.   Skin: Skin is warm and dry. Capillary refill takes less than 2 seconds. No rash noted. No erythema.   Psychiatric: He has a normal mood and affect. Thought content normal.       ED Course   Procedures  Labs Reviewed - No data to display       Imaging Results    None          Medications - No data to display  Medical Decision Making:   Initial Assessment:   Patient is a 57 year old male with no known past medical history presents to the ED due to a hand rash.   Differential Diagnosis:   Differential Diagnosis includes, but is not limited to:  Necrotizing fasciitis, erythema multiforme, Zuniga-Андрей syndrome, toxic epidermal necrolysis, DIC, cellulitis, Staph scalded skin syndrome, toxic shock syndrome, secondary syphilis, abscess, osteomyelitis, septic joint, MRSA, DVT, superficial thrombophlebitis,  varicose vein, drug eruption, allergic reaction/urticatia, irritant/contact dermatitis, viral exanthem, local trauma/contusion, abrasion.      ED Management:  57-year-old male with a nonhealing rash to both of his hands over the past 1 year.  Possible etiology may be tinea manuum.  I will prescribe miconazole cream for this.  I have placed an urgent ambulatory referral to Dermatology in the hopes the patient may be seen soon for this.  He may also return to the ED for any possible worsening.        Scribe Attestation:   Scribe #1: I performed the above scribed service and the documentation accurately describes the services I performed. I attest to the accuracy of the note.                   Clinical Impression:   Final diagnoses:  [R21] Rash of multiple digits of both hands (Primary)     I, Dr. Juan Khan, personally performed the services described in this documentation. All medical record entries made by the scribe were at my direction and in my presence. I have reviewed the chart and agree that the record reflects my personal performance and is accurate and complete. Juan Khan MD.  3:14 PM 03/05/2023     ED Disposition Condition    Discharge Stable          ED Prescriptions       Medication Sig Dispense Start Date End Date Auth. Provider    miconazole (MICOTIN) 2 % cream Apply topically 2 (two) times daily. 30 g 3/5/2023 -- Juan Khan MD          Follow-up Information       Follow up With Specialties Details Why Contact Info    Dermatologist                 Juan Khan MD  03/05/23 8627

## 2023-10-14 ENCOUNTER — HOSPITAL ENCOUNTER (EMERGENCY)
Facility: HOSPITAL | Age: 58
Discharge: HOME | End: 2023-10-14
Attending: EMERGENCY MEDICINE
Payer: MEDICAID

## 2023-10-14 VITALS
TEMPERATURE: 98 F | WEIGHT: 111.69 LBS | HEART RATE: 74 BPM | OXYGEN SATURATION: 99 % | SYSTOLIC BLOOD PRESSURE: 149 MMHG | DIASTOLIC BLOOD PRESSURE: 88 MMHG | RESPIRATION RATE: 18 BRPM | BODY MASS INDEX: 18.02 KG/M2

## 2023-10-14 DIAGNOSIS — K04.7 DENTAL ABSCESS: Primary | ICD-10-CM

## 2023-10-14 PROCEDURE — 99284 EMERGENCY DEPT VISIT MOD MDM: CPT

## 2023-10-14 RX ORDER — HYDROCODONE BITARTRATE AND ACETAMINOPHEN 5; 325 MG/1; MG/1
1 TABLET ORAL EVERY 4 HOURS PRN
Qty: 10 TABLET | Refills: 0 | Status: SHIPPED | OUTPATIENT
Start: 2023-10-14

## 2023-10-14 RX ORDER — AMOXICILLIN AND CLAVULANATE POTASSIUM 875; 125 MG/1; MG/1
1 TABLET, FILM COATED ORAL 2 TIMES DAILY
Qty: 20 TABLET | Refills: 0 | Status: SHIPPED | OUTPATIENT
Start: 2023-10-14 | End: 2023-11-19 | Stop reason: ALTCHOICE

## 2023-10-14 RX ORDER — IBUPROFEN 600 MG/1
600 TABLET ORAL EVERY 6 HOURS PRN
Qty: 20 TABLET | Refills: 0 | Status: SHIPPED | OUTPATIENT
Start: 2023-10-14

## 2023-10-14 NOTE — ED PROVIDER NOTES
Encounter Date: 10/14/2023       History     Chief Complaint   Patient presents with    Oral pain     Reports pain to right side lower gums. Started last night. Denies dental pain. States he took a dose of Augmentin 850 mg this morning. States he was given that medicine 6 months ago that he never took. Unsure what infection he had then.     Patient is a 58 year old male who complains of pain to his right lower gums beginning last night.  No fever.  He denies facial swelling.  No difficulty swallowing.      Review of patient's allergies indicates:  No Known Allergies  No past medical history on file.  No past surgical history on file.  Family History   Problem Relation Age of Onset    Hypertension Mother      Social History     Tobacco Use    Smoking status: Every Day     Current packs/day: 1.00     Types: Cigarettes    Smokeless tobacco: Never   Substance Use Topics    Alcohol use: No    Drug use: No     Review of Systems   Constitutional:  Negative for fever.   HENT:  Positive for dental problem.    Musculoskeletal:  Negative for neck stiffness.       Physical Exam     Initial Vitals [10/14/23 0636]   BP Pulse Resp Temp SpO2   (!) 149/88 74 18 98.1 °F (36.7 °C) 99 %      MAP       --         Physical Exam    Nursing note and vitals reviewed.  Constitutional: No distress.   HENT:   There is a right back molar with decay just below the gumline.  There is no facial swelling.  No swelling under the tongue.   Neck: Neck supple.   Cardiovascular:  Normal rate, regular rhythm and normal heart sounds.           Pulmonary/Chest: Breath sounds normal.   Musculoskeletal:      Cervical back: Neck supple.     Lymphadenopathy:     He has no cervical adenopathy.   Neurological: He is alert and oriented to person, place, and time.   Skin: Skin is warm and dry.   Psychiatric: Thought content normal.         ED Course   Procedures  Labs Reviewed - No data to display       Imaging Results    None          Medications - No data to  display  Medical Decision Making  DDX :  Dental decay, dental abscess, gingivitis, Lyle's angina.    Patient with severe dental decay.  There is no facial swelling.  I will place him on Augmentin and I have urged close follow-up with a dentist.  He may return to the ED if worsens prior to dental follow-up.    Risk  Prescription drug management.                               Clinical Impression:   Final diagnoses:  [K04.7] Dental abscess (Primary)        ED Disposition Condition    Discharge Stable          ED Prescriptions       Medication Sig Dispense Start Date End Date Auth. Provider    amoxicillin-clavulanate 875-125mg (AUGMENTIN) 875-125 mg per tablet Take 1 tablet by mouth 2 (two) times daily. 20 tablet 10/14/2023 -- Juan Khan MD    ibuprofen (ADVIL,MOTRIN) 600 MG tablet Take 1 tablet (600 mg total) by mouth every 6 (six) hours as needed for Pain. 20 tablet 10/14/2023 -- Juan Khan MD    HYDROcodone-acetaminophen (NORCO) 5-325 mg per tablet Take 1 tablet by mouth every 4 (four) hours as needed for Pain. 10 tablet 10/14/2023 -- Juan Khan MD          Follow-up Information       Follow up With Specialties Details Why Contact Info    Your dentist  Schedule an appointment as soon as possible for a visit                Juan Khan MD  10/14/23 2265

## 2023-11-19 ENCOUNTER — OFFICE VISIT (OUTPATIENT)
Dept: URGENT CARE | Facility: CLINIC | Age: 58
End: 2023-11-19
Payer: MEDICAID

## 2023-11-19 VITALS
TEMPERATURE: 98 F | RESPIRATION RATE: 18 BRPM | WEIGHT: 150 LBS | HEIGHT: 66 IN | OXYGEN SATURATION: 95 % | SYSTOLIC BLOOD PRESSURE: 135 MMHG | DIASTOLIC BLOOD PRESSURE: 83 MMHG | HEART RATE: 77 BPM | BODY MASS INDEX: 24.11 KG/M2

## 2023-11-19 DIAGNOSIS — K04.7 DENTAL INFECTION: Primary | ICD-10-CM

## 2023-11-19 PROCEDURE — 99213 OFFICE O/P EST LOW 20 MIN: CPT | Mod: S$GLB,,, | Performed by: NURSE PRACTITIONER

## 2023-11-19 PROCEDURE — 99213 PR OFFICE/OUTPT VISIT, EST, LEVL III, 20-29 MIN: ICD-10-PCS | Mod: S$GLB,,, | Performed by: NURSE PRACTITIONER

## 2023-11-19 RX ORDER — CLINDAMYCIN HYDROCHLORIDE 300 MG/1
300 CAPSULE ORAL 3 TIMES DAILY
Qty: 30 CAPSULE | Refills: 0 | Status: SHIPPED | OUTPATIENT
Start: 2023-11-19 | End: 2023-11-29

## 2023-11-19 NOTE — PROGRESS NOTES
"Subjective:      Patient ID: Devyn Hazel is a 58 y.o. male.    Vitals:  height is 5' 6" (1.676 m) and weight is 68 kg (150 lb). His oral temperature is 97.5 °F (36.4 °C). His blood pressure is 135/83 and his pulse is 77. His respiration is 18 and oxygen saturation is 95%.     Chief Complaint: Dental Pain    58 yr male present with dental pain on the right side of the mouth. Pt states that he thinks that he has a infection. Onset x2 days. Treatments at home include Ibuprofen, did had the dental infection 1 month ago and was prescribed Augmentin and the pain medication, patient reports he still has the ibuprofen that was prescribed last time,  denies fever, body aches or chills, denies cough, wheezing or shortness of breath, denies nausea, vomiting, diarrhea or abdominal pain, denies chest pain or dizziness positional lightheadedness, denies sore throat or trouble swallowing, denies loss of taste or smell, or any other symptoms        Dental Pain   This is a new problem. The current episode started in the past 7 days. The problem occurs constantly. The problem has been gradually worsening. The pain is at a severity of 7/10. The pain is moderate. Associated symptoms include facial pain. Pertinent negatives include no difficulty swallowing, oral bleeding, sinus pressure or thermal sensitivity. Treatments tried: Ibuprofen. The treatment provided no relief.       HENT:  Positive for dental problem. Negative for sinus pressure.       Objective:     Physical Exam   Constitutional: He is oriented to person, place, and time. He appears well-developed. He is cooperative.  Non-toxic appearance. He does not appear ill. No distress.   HENT:   Head: Normocephalic and atraumatic.   Ears:   Right Ear: Hearing, tympanic membrane, external ear and ear canal normal.   Left Ear: Hearing, tympanic membrane, external ear and ear canal normal.   Nose: Nose normal. No mucosal edema, rhinorrhea or nasal deformity. No epistaxis. Right sinus " exhibits no maxillary sinus tenderness and no frontal sinus tenderness. Left sinus exhibits no maxillary sinus tenderness and no frontal sinus tenderness.   Mouth/Throat: Uvula is midline, oropharynx is clear and moist and mucous membranes are normal. No trismus in the jaw. Normal dentition. No dental abscesses, uvula swelling or dental caries. No oropharyngeal exudate, posterior oropharyngeal edema or posterior oropharyngeal erythema.       No oropharyngeal erythema, edema or or exudate noted, uvula midline, no visible abscess noted, tenderness and erythema to gumline as above, no swelling noted.  No facial swelling noted.  no active drainage or discharge noted      Comments: No oropharyngeal erythema, edema or or exudate noted, uvula midline, no visible abscess noted, tenderness and erythema to gumline as above, no swelling noted.  No facial swelling noted.  no active drainage or discharge noted  Eyes: Conjunctivae and lids are normal. No scleral icterus.   Neck: Trachea normal and phonation normal. Neck supple. No edema present. No erythema present. No neck rigidity present.   Cardiovascular: Normal rate, regular rhythm, normal heart sounds and normal pulses.   Pulmonary/Chest: Effort normal and breath sounds normal. No respiratory distress. He has no decreased breath sounds. He has no rhonchi.   Abdominal: Normal appearance.   Musculoskeletal: Normal range of motion.         General: No deformity. Normal range of motion.   Neurological: He is alert and oriented to person, place, and time. He exhibits normal muscle tone. Coordination normal.   Skin: Skin is warm, dry, intact, not diaphoretic and not pale.   Psychiatric: His speech is normal and behavior is normal. Judgment and thought content normal.   Nursing note and vitals reviewed.      Patient in no acute distress.  Vitals reassuring.  Advised patient to follow-up with dentist.  Discussed results/diagnosis/plan in depth with patient in clinic. Strict  precautions given to patient to monitor for worsening signs and symptoms. Advised to follow up with primary.All questions answered. Strict ER precautions given. If your symptoms worsens or fail to improve you should go to the Emergency Room. Discharge and follow-up instructions given verbally/printed. Discharge and follow-up instructions discussed with the patient who expressed understanding and willingness to comply with my recommendations.Patient voiced understanding and in agreement with current treatment plan.     Please be advised this text was dictated with [employer] software and may contain errors due to translation.      Assessment:     1. Dental infection        Plan:       Dental infection  -     clindamycin (CLEOCIN) 300 MG capsule; Take 1 capsule (300 mg total) by mouth 3 (three) times daily. for 10 days  Dispense: 30 capsule; Refill: 0                  Patient Instructions   PLEASE READ YOUR DISCHARGE INSTRUCTIONS ENTIRELY AS IT CONTAINS IMPORTANT INFORMATION.      Use the mouthwash twice daily    Take the antibiotics to completion    Please see a dentist ASAP for further treatment as the infection can spread to your jaw bone. Ideally in 24-48 hours.    Please return or see your primary care doctor if you develop new or worsening symptoms.     You must understand that you have received an Urgent Care treatment only and that you may be released before all of your medical problems are known or treated.

## 2024-09-20 ENCOUNTER — OFFICE VISIT (OUTPATIENT)
Dept: FAMILY MEDICINE | Facility: CLINIC | Age: 59
End: 2024-09-20
Payer: MEDICAID

## 2024-09-20 VITALS
DIASTOLIC BLOOD PRESSURE: 77 MMHG | WEIGHT: 141.88 LBS | SYSTOLIC BLOOD PRESSURE: 115 MMHG | OXYGEN SATURATION: 98 % | TEMPERATURE: 98 F | BODY MASS INDEX: 22.8 KG/M2 | HEIGHT: 66 IN

## 2024-09-20 DIAGNOSIS — R21 RASH: Primary | ICD-10-CM

## 2024-09-20 PROCEDURE — 99999 PR PBB SHADOW E&M-EST. PATIENT-LVL V: CPT | Mod: PBBFAC,,, | Performed by: FAMILY MEDICINE

## 2024-09-20 PROCEDURE — 99215 OFFICE O/P EST HI 40 MIN: CPT | Mod: PBBFAC,PN | Performed by: FAMILY MEDICINE

## 2024-09-20 RX ORDER — SILVER SULFADIAZINE 10 G/1000G
CREAM TOPICAL 2 TIMES DAILY
Qty: 50 G | Refills: 0 | Status: SHIPPED | OUTPATIENT
Start: 2024-09-20

## 2024-09-20 RX ORDER — UPADACITINIB 15 MG/1
15 TABLET, EXTENDED RELEASE ORAL
COMMUNITY
Start: 2024-08-29

## 2024-09-20 RX ORDER — MUPIROCIN 20 MG/G
OINTMENT TOPICAL
Qty: 22 G | Refills: 1 | Status: SHIPPED | OUTPATIENT
Start: 2024-09-20

## 2024-09-20 RX ORDER — CEPHALEXIN 500 MG/1
500 CAPSULE ORAL EVERY 6 HOURS
Qty: 30 CAPSULE | Refills: 0 | Status: SHIPPED | OUTPATIENT
Start: 2024-09-20

## 2024-09-20 RX ORDER — CETIRIZINE HYDROCHLORIDE 10 MG/1
10 TABLET ORAL
COMMUNITY
Start: 2024-08-22

## 2024-09-20 NOTE — PROGRESS NOTES
Subjective:       Patient ID: Devyn Hazel is a 59 y.o. male.    Chief Complaint: Rash (Bilateral hands X 3 weeks ) and Edema (Bilateral hands x 3 weeks)    58 y/o male with unremarkable hx , c/o rash on both hands, on palm , started 3 weeks, itching more so at night, that wakes him up in the morning, cools off by soaking in cold water for 10-15 minutes  Feels pain,  No unusual exposure, does not recall any other exposure        Review of Systems    Objective:      Physical Exam  Vitals and nursing note reviewed.   Constitutional:       General: He is not in acute distress.     Appearance: Normal appearance. He is well-developed. He is not diaphoretic.   HENT:      Head: Normocephalic and atraumatic.      Nose: Nose normal.   Eyes:      General: Lids are normal.      Conjunctiva/sclera: Conjunctivae normal.   Neck:      Trachea: Trachea and phonation normal.   Cardiovascular:      Rate and Rhythm: Normal rate and regular rhythm.      Pulses: Normal pulses.      Heart sounds: Normal heart sounds.   Pulmonary:      Effort: Pulmonary effort is normal.      Breath sounds: Normal breath sounds.   Abdominal:      General: Bowel sounds are normal. There is no abdominal bruit.      Palpations: Abdomen is soft. There is no mass or pulsatile mass.   Musculoskeletal:         General: No deformity.      Cervical back: Full passive range of motion without pain, normal range of motion and neck supple.      Comments: pALM OF BOTH HANDS with erythematous macular rash, more so on tips of 4-5th finger distally on distal phalanx,slightly oozing  Flexing is limited  Right hand with milder version of same, no oozing       Skin:     General: Skin is warm and dry.   Neurological:      Mental Status: He is alert and oriented to person, place, and time.      Sensory: No sensory deficit.      Deep Tendon Reflexes: Reflexes are normal and symmetric.   Psychiatric:         Speech: Speech normal.         Behavior: Behavior normal. Behavior is  cooperative.         Thought Content: Thought content normal.         Judgment: Judgment normal.         Assessment:       1. Rash        Plan:       Devyn was seen today for rash and edema.    Diagnoses and all orders for this visit:    Rash    Other orders  -     cephALEXin (KEFLEX) 500 MG capsule; Take 1 capsule (500 mg total) by mouth every 6 (six) hours.  -     mupirocin (BACTROBAN) 2 % ointment; Apply to affected area 3 times daily  -     silver sulfADIAZINE 1% (SILVADENE) 1 % cream; Apply topically 2 (two) times daily.        Considering secondary infection will start him on keflex,    Will use bactroban alternate with Silvadene for pain and soothing/burning  Refer to Derm

## 2024-09-23 ENCOUNTER — TELEPHONE (OUTPATIENT)
Dept: FAMILY MEDICINE | Facility: CLINIC | Age: 59
End: 2024-09-23
Payer: MEDICAID

## 2024-09-29 ENCOUNTER — HOSPITAL ENCOUNTER (EMERGENCY)
Facility: HOSPITAL | Age: 59
Discharge: HOME | End: 2024-09-30
Attending: EMERGENCY MEDICINE
Payer: MEDICAID

## 2024-09-29 VITALS
TEMPERATURE: 98 F | WEIGHT: 143 LBS | HEIGHT: 66 IN | OXYGEN SATURATION: 100 % | HEART RATE: 92 BPM | BODY MASS INDEX: 22.98 KG/M2 | RESPIRATION RATE: 16 BRPM | SYSTOLIC BLOOD PRESSURE: 136 MMHG | DIASTOLIC BLOOD PRESSURE: 76 MMHG

## 2024-09-29 DIAGNOSIS — T78.40XA ALLERGIC REACTION, INITIAL ENCOUNTER: Primary | ICD-10-CM

## 2024-09-29 PROCEDURE — 99283 EMERGENCY DEPT VISIT LOW MDM: CPT

## 2024-09-29 RX ORDER — PREDNISONE 20 MG/1
40 TABLET ORAL
Status: DISCONTINUED | OUTPATIENT
Start: 2024-09-30 | End: 2024-09-29

## 2024-09-29 RX ORDER — ACETAMINOPHEN 500 MG
1000 TABLET ORAL
Status: COMPLETED | OUTPATIENT
Start: 2024-09-30 | End: 2024-09-30

## 2024-09-30 PROCEDURE — 25000003 PHARM REV CODE 250: Performed by: EMERGENCY MEDICINE

## 2024-09-30 RX ADMIN — ACETAMINOPHEN 1000 MG: 500 TABLET ORAL at 12:09

## 2024-09-30 NOTE — ED PROVIDER NOTES
Emergency Department Encounter  Provider Note  Encounter Date: 9/29/2024    Patient Name: Devyn Hazel  MRN: [id number]    History of Present Illness   HPI  History of Present Illness:    Chief Complaint:   Chief Complaint   Patient presents with    Rash     Pt presents to the ED c/o rash. Pt states he has eczema and was prescribed 4 medications for his skin (zyrtec, Silver sulfadiazine, Mupirocin, and Cefalexin) and since taking these meds he has had a rash appear on his entire body.      59m pw all over body rash for over a day. Denies difficulty breathing, has never happened before. Started keflex a few days ago for superimposed infection, prescribed by his doctor. Has eczema but this rash is all over his body. Taking zyrtec, silver sulfadiazine, mupirocin as well.     The following PMH/PSH/SocHx/FamHx has been reviewed by myself:  No past medical history on file.  No past surgical history on file.  Social History     Tobacco Use    Smoking status: Every Day     Current packs/day: 1.00     Types: Cigarettes    Smokeless tobacco: Never   Substance Use Topics    Alcohol use: No    Drug use: No     Family History   Problem Relation Name Age of Onset    Hypertension Mother       Allergies reviewed:  Review of patient's allergies indicates:   Allergen Reactions    Keflex [cephalexin] Rash     Medications reviewed:  Medication List with Changes/Refills   Current Medications    ACETAMINOPHEN (TYLENOL EXTRA STRENGTH) 500 MG TABLET    Take 500 mg by mouth every 6 (six) hours as needed for Pain.     ACETAMINOPHEN (TYLENOL) 500 MG TABLET    Take 2 tablets (1,000 mg total) by mouth every 6 (six) hours as needed.    AMOXICILLIN (AMOXIL) 500 MG CAPSULE    Take 500 mg by mouth every 8 (eight) hours.    ATORVASTATIN (LIPITOR) 20 MG TABLET    Take 20 mg by mouth every evening.    AZITHROMYCIN (ZITHROMAX Z-GHASSAN) 250 MG TABLET    Take 2 tablets (500 mg) on  Day 1,  followed by 1 tablet (250 mg) once daily on Days 2 through 5.     CEPHALEXIN (KEFLEX) 500 MG CAPSULE    Take 1 capsule (500 mg total) by mouth every 6 (six) hours.    CETIRIZINE (ZYRTEC) 10 MG TABLET    Take 10 mg by mouth.    CLOTRIMAZOLE (LOTRIMIN) 1 % CREAM    Apply to affected area 2 times daily    CLOTRIMAZOLE-BETAMETHASONE 1-0.05% (LOTRISONE) CREAM    Apply topically.    DICLOFENAC (VOLTAREN) 75 MG EC TABLET    Take 1 tablet (75 mg total) by mouth 2 (two) times daily.    ERGOCALCIFEROL (ERGOCALCIFEROL) 50,000 UNIT CAP    Take 1 capsule (50,000 Units total) by mouth every 7 days.    HYDROCODONE-ACETAMINOPHEN (NORCO) 5-325 MG PER TABLET    Take 1 tablet by mouth every 4 (four) hours as needed for Pain.    IBUPROFEN (ADVIL,MOTRIN) 600 MG TABLET    Take 1 tablet (600 mg total) by mouth every 6 (six) hours as needed for Pain.    LIDOCAINE HCL 2% (XYLOCAINE) 2 % JELLY    Apply topically as needed.    MELOXICAM (MOBIC) 15 MG TABLET    Take 15 mg by mouth daily as needed.    MICONAZOLE (MICOTIN) 2 % CREAM    Apply topically 2 (two) times daily.    MUPIROCIN (BACTROBAN) 2 % OINTMENT    Apply topically 3 (three) times daily.    MUPIROCIN (BACTROBAN) 2 % OINTMENT    Apply to affected area 3 times daily    NAPROXEN (NAPROSYN) 500 MG TABLET    Take 1 tablet (500 mg total) by mouth 2 (two) times daily as needed (pain).    PRAVASTATIN (PRAVACHOL) 20 MG TABLET    Take 20 mg by mouth nightly.    RINVOQ 15 MG 24 HR TABLET    Take 15 mg by mouth.    SILDENAFIL (VIAGRA) 100 MG TABLET    Take  mg by mouth daily as needed.    SILVER SULFADIAZINE 1% (SILVADENE) 1 % CREAM    Apply topically 2 (two) times daily.    SULFAMETHOXAZOLE-TRIMETHOPRIM 800-160MG (BACTRIM DS) 800-160 MG TAB    Take 1 tablet by mouth 2 (two) times daily.    TIZANIDINE (ZANAFLEX) 4 MG TABLET    Take 4 mg by mouth 2 (two) times daily as needed.    TRAMADOL (ULTRAM) 50 MG TABLET    Take 1 tablet (50 mg total) by mouth every 6 (six) hours as needed for Pain.    TRIAMCINOLONE ACETONIDE 0.5% (KENALOG) 0.5 % CREA    Apply to  tops of feet twice daily    ZINC ACETATE 50 MG (ZINC) CAP    Take one po bid       Physical Exam     Initial Vitals [09/29/24 2303]   BP Pulse Resp Temp SpO2   136/76 92 16 97.8 °F (36.6 °C) 100 %      MAP       --           Triage vital signs reviewed.    Constitutional: Well-nourished, well-developed. Not in acute distress.  HENT: Normocephalic, atraumatic. Moist mucous membranes. No sloughing of oropharyngeal membranes, no erythema., no swelling.   Eyes: No conjunctival injection.  Resp: Normal respiratory effort, breathing unlabored.  Cardio: Regular rate and rhythm.  GI: Abdomen non-distended.   MSK: Extremities without any deformities noted. No lower extremity edema.  Skin: Warm and dry. Flat erythematous confluent rash on extremities, torso, back. No oropharyngeal involvement.  Neuro: Awake and alert. Moves all extremities.    ED Course   Procedures    Medical Decision Making    History Acquisition     The history is provided by the patient.     Review of prior external/non ED notes: dealing with eczema, seen by derm; saw MD 9/20 and treated with keflex, mupirocin, silvadene.     Differential Diagnoses   Based on available information and initial assessment, the working Differential Diagnosis includes, but is not limited to:  Necrotizing fasciitis, erythema multiforme, Zuniga-Андрей syndrome, toxic epidermal necrolysis, DIC, cellulitis, Staph scalded skin syndrome, toxic shock syndrome, secondary syphilis, abscess, osteomyelitis, septic joint, MRSA, DVT, superficial thrombophlebitis, varicose vein, drug eruption, allergic reaction/urticatia, irritant/contact dermatitis, viral exanthem, local trauma/contusion, abrasion.    EKG   EKG ordered and independently reviewed by me:     Labs   Lab tests ordered and independently reviewed by me:    Labs Reviewed - No data to display    Imaging   Imaging ordered and independently reviewed by me:   Imaging Results    None            Additional Consideration   Emad I  Yasemin  presents to the Emergency Department today with likely allergic rxn to keflex. Does not look like he has had it before, and pt doesn't think he has had it either. No sloughing, neg Nikolsky's sign, no oropharyngeal involvement. Will have him stop the keflex and fu with derm; appt is 10/21. Advised him to come back if he has worsening symptoms.     Additional testing considered but not indicated during the course of this workup: further imaging and labwork, not indicated  Co-morbidities/chronic illness/exacerbation of chronic illness considered which impacted clinical decision making: eczema  Procedures done in the ED or plan for the OR: No  Social determinants of care considered during development of treatment plan include: Decreased medical literacy  Discussion of management or test interpretation with external provider: No  DNR discussion: No    The patient's list of active medications and allergies as documented per RN staff has been reviewed.  Medications given in the ED and/or prescribed:   Medications   acetaminophen tablet 1,000 mg (has no administration in time range)                  Explanation of disposition: Discharge    Clinical Impression:     1. Allergic reaction, initial encounter         All results from the workup were reviewed with the patient/family in detail. I discussed with the patient and/or the family/caregiver that today's evaluation in the ED does not suggest any emergent or life-threatening medical conditions that would require hospitalization or immediate intervention beyond what was provided in the ED. I believe the patient is safe for discharge.  However, a reassuring evaluation in the ED does not preclude the presence or development of a serious or life-threatening condition. As such, strict return precautions were discussed with the patient expressing understanding of instructions, and all questions answered. The patient/family communicates understanding of all this  information and all remaining questions and concerns were addressed at this time.    The patient/family has been provided with verbal and printed direction regarding our final diagnosis(es) as well as instructions regarding use of OTC and/or Rx medications intended to manage the patient's aforementioned conditions including:  ED Prescriptions    None       The patient's condition does not warrant review of the  and prescription of controlled substances.      ED Disposition Condition    Discharge Stable               Emily Ann MD  09/30/24 0012

## 2024-09-30 NOTE — ED NOTES
Provided patient with discharge instructions, prescriptions, and follow up care. Educated patient to return to ED if s/s do not improve or worsen. A&Ox4. VU at this time. Patient ambulatory with steady gait upon discharge.

## 2024-10-13 ENCOUNTER — HOSPITAL ENCOUNTER (INPATIENT)
Facility: HOSPITAL | Age: 59
LOS: 5 days | Discharge: HOME | DRG: 607 | End: 2024-10-18
Attending: EMERGENCY MEDICINE | Admitting: HOSPITALIST
Payer: MEDICAID

## 2024-10-13 DIAGNOSIS — R80.9 PROTEINURIA, UNSPECIFIED TYPE: ICD-10-CM

## 2024-10-13 DIAGNOSIS — R21 RASH: Primary | ICD-10-CM

## 2024-10-13 DIAGNOSIS — R07.9 CHEST PAIN: ICD-10-CM

## 2024-10-13 PROBLEM — L30.9 DERMATITIS: Status: ACTIVE | Noted: 2024-10-13

## 2024-10-13 PROBLEM — B37.0 THRUSH: Status: ACTIVE | Noted: 2024-10-13

## 2024-10-13 LAB
ALBUMIN SERPL BCP-MCNC: 2.9 G/DL (ref 3.5–5.2)
ALLENS TEST: NORMAL
ALLENS TEST: NORMAL
ALP SERPL-CCNC: 76 U/L (ref 55–135)
ALT SERPL W/O P-5'-P-CCNC: 17 U/L (ref 10–44)
ANION GAP SERPL CALC-SCNC: 12 MMOL/L (ref 8–16)
AST SERPL-CCNC: 26 U/L (ref 10–40)
BACTERIA #/AREA URNS AUTO: ABNORMAL /HPF
BASOPHILS # BLD AUTO: 0.03 K/UL (ref 0–0.2)
BASOPHILS NFR BLD: 0.2 % (ref 0–1.9)
BILIRUB SERPL-MCNC: 1.1 MG/DL (ref 0.1–1)
BILIRUB UR QL STRIP: NEGATIVE
BUN SERPL-MCNC: 21 MG/DL (ref 6–20)
CALCIUM SERPL-MCNC: 9.3 MG/DL (ref 8.7–10.5)
CHLORIDE SERPL-SCNC: 103 MMOL/L (ref 95–110)
CLARITY UR REFRACT.AUTO: CLEAR
CO2 SERPL-SCNC: 23 MMOL/L (ref 23–29)
COLOR UR AUTO: YELLOW
CREAT SERPL-MCNC: 1.1 MG/DL (ref 0.5–1.4)
CRP SERPL-MCNC: 104 MG/L (ref 0–8.2)
DIFFERENTIAL METHOD BLD: ABNORMAL
EOSINOPHIL # BLD AUTO: 1 K/UL (ref 0–0.5)
EOSINOPHIL NFR BLD: 6.8 % (ref 0–8)
ERYTHROCYTE [DISTWIDTH] IN BLOOD BY AUTOMATED COUNT: 14.1 % (ref 11.5–14.5)
ERYTHROCYTE [SEDIMENTATION RATE] IN BLOOD BY PHOTOMETRIC METHOD: 30 MM/HR (ref 0–23)
EST. GFR  (NO RACE VARIABLE): >60 ML/MIN/1.73 M^2
GLUCOSE SERPL-MCNC: 96 MG/DL (ref 70–110)
GLUCOSE UR QL STRIP: NEGATIVE
HCT VFR BLD AUTO: 39.7 % (ref 40–54)
HCV AB SERPL QL IA: NORMAL
HGB BLD-MCNC: 13.6 G/DL (ref 14–18)
HGB UR QL STRIP: ABNORMAL
HIV 1+2 AB+HIV1 P24 AG SERPL QL IA: NORMAL
HYALINE CASTS UR QL AUTO: 1 /LPF
IMM GRANULOCYTES # BLD AUTO: 0.06 K/UL (ref 0–0.04)
IMM GRANULOCYTES NFR BLD AUTO: 0.4 % (ref 0–0.5)
KETONES UR QL STRIP: ABNORMAL
LDH SERPL L TO P-CCNC: 1.14 MMOL/L (ref 0.5–2.2)
LDH SERPL L TO P-CCNC: 1.33 MMOL/L (ref 0.5–2.2)
LEUKOCYTE ESTERASE UR QL STRIP: ABNORMAL
LYMPHOCYTES # BLD AUTO: 1.6 K/UL (ref 1–4.8)
LYMPHOCYTES NFR BLD: 11.2 % (ref 18–48)
MCH RBC QN AUTO: 30.8 PG (ref 27–31)
MCHC RBC AUTO-ENTMCNC: 34.3 G/DL (ref 32–36)
MCV RBC AUTO: 90 FL (ref 82–98)
MICROSCOPIC COMMENT: ABNORMAL
MONOCYTES # BLD AUTO: 1 K/UL (ref 0.3–1)
MONOCYTES NFR BLD: 6.8 % (ref 4–15)
NEUTROPHILS # BLD AUTO: 10.8 K/UL (ref 1.8–7.7)
NEUTROPHILS NFR BLD: 74.6 % (ref 38–73)
NITRITE UR QL STRIP: NEGATIVE
NRBC BLD-RTO: 0 /100 WBC
PH UR STRIP: 7 [PH] (ref 5–8)
PLATELET # BLD AUTO: 503 K/UL (ref 150–450)
PMV BLD AUTO: 8.9 FL (ref 9.2–12.9)
POTASSIUM SERPL-SCNC: 4.2 MMOL/L (ref 3.5–5.1)
PROT SERPL-MCNC: 6.5 G/DL (ref 6–8.4)
PROT UR QL STRIP: ABNORMAL
RBC # BLD AUTO: 4.41 M/UL (ref 4.6–6.2)
RBC #/AREA URNS AUTO: 45 /HPF (ref 0–4)
SAMPLE: NORMAL
SAMPLE: NORMAL
SITE: NORMAL
SITE: NORMAL
SODIUM SERPL-SCNC: 138 MMOL/L (ref 136–145)
SP GR UR STRIP: >=1.03 (ref 1–1.03)
SQUAMOUS #/AREA URNS AUTO: 0 /HPF
URN SPEC COLLECT METH UR: ABNORMAL
WBC # BLD AUTO: 14.49 K/UL (ref 3.9–12.7)
WBC #/AREA URNS AUTO: 35 /HPF (ref 0–5)

## 2024-10-13 PROCEDURE — 63600175 PHARM REV CODE 636 W HCPCS: Performed by: HOSPITALIST

## 2024-10-13 PROCEDURE — 87040 BLOOD CULTURE FOR BACTERIA: CPT

## 2024-10-13 PROCEDURE — 25000003 PHARM REV CODE 250: Performed by: STUDENT IN AN ORGANIZED HEALTH CARE EDUCATION/TRAINING PROGRAM

## 2024-10-13 PROCEDURE — 86704 HEP B CORE ANTIBODY TOTAL: CPT | Performed by: PHYSICIAN ASSISTANT

## 2024-10-13 PROCEDURE — 99900035 HC TECH TIME PER 15 MIN (STAT)

## 2024-10-13 PROCEDURE — 86803 HEPATITIS C AB TEST: CPT | Performed by: PHYSICIAN ASSISTANT

## 2024-10-13 PROCEDURE — G0378 HOSPITAL OBSERVATION PER HR: HCPCS

## 2024-10-13 PROCEDURE — 88342 IMHCHEM/IMCYTCHM 1ST ANTB: CPT | Mod: 26,,, | Performed by: PATHOLOGY

## 2024-10-13 PROCEDURE — 63600175 PHARM REV CODE 636 W HCPCS

## 2024-10-13 PROCEDURE — 86140 C-REACTIVE PROTEIN: CPT | Performed by: EMERGENCY MEDICINE

## 2024-10-13 PROCEDURE — 93005 ELECTROCARDIOGRAM TRACING: CPT

## 2024-10-13 PROCEDURE — 88305 TISSUE EXAM BY PATHOLOGIST: CPT | Mod: 26,,, | Performed by: PATHOLOGY

## 2024-10-13 PROCEDURE — 25000003 PHARM REV CODE 250: Performed by: HOSPITALIST

## 2024-10-13 PROCEDURE — 86036 ANCA SCREEN EACH ANTIBODY: CPT | Performed by: PHYSICIAN ASSISTANT

## 2024-10-13 PROCEDURE — 93010 ELECTROCARDIOGRAM REPORT: CPT | Mod: ,,, | Performed by: INTERNAL MEDICINE

## 2024-10-13 PROCEDURE — 88305 TISSUE EXAM BY PATHOLOGIST: CPT | Performed by: PATHOLOGY

## 2024-10-13 PROCEDURE — 96374 THER/PROPH/DIAG INJ IV PUSH: CPT

## 2024-10-13 PROCEDURE — 86593 SYPHILIS TEST NON-TREP QUANT: CPT | Performed by: PHYSICIAN ASSISTANT

## 2024-10-13 PROCEDURE — 83516 IMMUNOASSAY NONANTIBODY: CPT | Mod: 59 | Performed by: PHYSICIAN ASSISTANT

## 2024-10-13 PROCEDURE — 81001 URINALYSIS AUTO W/SCOPE: CPT

## 2024-10-13 PROCEDURE — 80053 COMPREHEN METABOLIC PANEL: CPT

## 2024-10-13 PROCEDURE — 86706 HEP B SURFACE ANTIBODY: CPT | Mod: 91 | Performed by: PHYSICIAN ASSISTANT

## 2024-10-13 PROCEDURE — 12000002 HC ACUTE/MED SURGE SEMI-PRIVATE ROOM

## 2024-10-13 PROCEDURE — 87389 HIV-1 AG W/HIV-1&-2 AB AG IA: CPT | Performed by: PHYSICIAN ASSISTANT

## 2024-10-13 PROCEDURE — 85025 COMPLETE CBC W/AUTO DIFF WBC: CPT

## 2024-10-13 PROCEDURE — 99214 OFFICE O/P EST MOD 30 MIN: CPT | Mod: ,,, | Performed by: DERMATOLOGY

## 2024-10-13 PROCEDURE — 25000003 PHARM REV CODE 250: Performed by: EMERGENCY MEDICINE

## 2024-10-13 PROCEDURE — 0HBJXZX EXCISION OF LEFT UPPER LEG SKIN, EXTERNAL APPROACH, DIAGNOSTIC: ICD-10-PCS | Performed by: STUDENT IN AN ORGANIZED HEALTH CARE EDUCATION/TRAINING PROGRAM

## 2024-10-13 PROCEDURE — 87086 URINE CULTURE/COLONY COUNT: CPT

## 2024-10-13 PROCEDURE — 83516 IMMUNOASSAY NONANTIBODY: CPT | Performed by: PHYSICIAN ASSISTANT

## 2024-10-13 PROCEDURE — 83605 ASSAY OF LACTIC ACID: CPT

## 2024-10-13 PROCEDURE — 99285 EMERGENCY DEPT VISIT HI MDM: CPT | Mod: 25

## 2024-10-13 PROCEDURE — 86060 ANTISTREPTOLYSIN O TITER: CPT | Performed by: PHYSICIAN ASSISTANT

## 2024-10-13 PROCEDURE — 85652 RBC SED RATE AUTOMATED: CPT | Performed by: EMERGENCY MEDICINE

## 2024-10-13 PROCEDURE — 96361 HYDRATE IV INFUSION ADD-ON: CPT

## 2024-10-13 RX ORDER — DOCUSATE SODIUM 100 MG
400 CAPSULE ORAL EVERY 8 HOURS
Status: DISCONTINUED | OUTPATIENT
Start: 2024-10-13 | End: 2024-10-18 | Stop reason: HOSPADM

## 2024-10-13 RX ORDER — HYDROXYZINE HYDROCHLORIDE 25 MG/1
50 TABLET, FILM COATED ORAL 4 TIMES DAILY PRN
Status: DISCONTINUED | OUTPATIENT
Start: 2024-10-13 | End: 2024-10-18 | Stop reason: HOSPADM

## 2024-10-13 RX ORDER — SODIUM CHLORIDE 0.9 % (FLUSH) 0.9 %
10 SYRINGE (ML) INJECTION EVERY 12 HOURS PRN
Status: DISCONTINUED | OUTPATIENT
Start: 2024-10-13 | End: 2024-10-18 | Stop reason: HOSPADM

## 2024-10-13 RX ORDER — GLUCAGON 1 MG
1 KIT INJECTION
Status: DISCONTINUED | OUTPATIENT
Start: 2024-10-13 | End: 2024-10-18 | Stop reason: HOSPADM

## 2024-10-13 RX ORDER — ACETAMINOPHEN 325 MG/1
650 TABLET ORAL EVERY 8 HOURS PRN
Status: DISCONTINUED | OUTPATIENT
Start: 2024-10-13 | End: 2024-10-15

## 2024-10-13 RX ORDER — ONDANSETRON HYDROCHLORIDE 2 MG/ML
4 INJECTION, SOLUTION INTRAVENOUS EVERY 8 HOURS PRN
Status: DISCONTINUED | OUTPATIENT
Start: 2024-10-13 | End: 2024-10-18 | Stop reason: HOSPADM

## 2024-10-13 RX ORDER — MORPHINE SULFATE 4 MG/ML
4 INJECTION, SOLUTION INTRAMUSCULAR; INTRAVENOUS
Status: COMPLETED | OUTPATIENT
Start: 2024-10-13 | End: 2024-10-13

## 2024-10-13 RX ORDER — TALC
6 POWDER (GRAM) TOPICAL NIGHTLY PRN
Status: DISCONTINUED | OUTPATIENT
Start: 2024-10-13 | End: 2024-10-18 | Stop reason: HOSPADM

## 2024-10-13 RX ORDER — AMOXICILLIN 250 MG
1 CAPSULE ORAL 2 TIMES DAILY
Status: DISCONTINUED | OUTPATIENT
Start: 2024-10-13 | End: 2024-10-18 | Stop reason: HOSPADM

## 2024-10-13 RX ORDER — NALOXONE HCL 0.4 MG/ML
0.02 VIAL (ML) INJECTION
Status: DISCONTINUED | OUTPATIENT
Start: 2024-10-13 | End: 2024-10-18 | Stop reason: HOSPADM

## 2024-10-13 RX ORDER — ACETAMINOPHEN 325 MG/1
650 TABLET ORAL EVERY 4 HOURS PRN
Status: DISCONTINUED | OUTPATIENT
Start: 2024-10-13 | End: 2024-10-18 | Stop reason: HOSPADM

## 2024-10-13 RX ORDER — GABAPENTIN 300 MG/1
300 CAPSULE ORAL 3 TIMES DAILY
Status: DISCONTINUED | OUTPATIENT
Start: 2024-10-13 | End: 2024-10-18 | Stop reason: HOSPADM

## 2024-10-13 RX ORDER — NYSTATIN 100000 [USP'U]/ML
500000 SUSPENSION ORAL
Status: DISCONTINUED | OUTPATIENT
Start: 2024-10-13 | End: 2024-10-18 | Stop reason: HOSPADM

## 2024-10-13 RX ORDER — IBUPROFEN 200 MG
16 TABLET ORAL
Status: DISCONTINUED | OUTPATIENT
Start: 2024-10-13 | End: 2024-10-18 | Stop reason: HOSPADM

## 2024-10-13 RX ORDER — IBUPROFEN 200 MG
24 TABLET ORAL
Status: DISCONTINUED | OUTPATIENT
Start: 2024-10-13 | End: 2024-10-18 | Stop reason: HOSPADM

## 2024-10-13 RX ORDER — PREDNISONE 20 MG/1
60 TABLET ORAL DAILY
Status: DISCONTINUED | OUTPATIENT
Start: 2024-10-13 | End: 2024-10-18 | Stop reason: HOSPADM

## 2024-10-13 RX ORDER — GABAPENTIN 300 MG/1
300 CAPSULE ORAL 3 TIMES DAILY
Status: DISCONTINUED | OUTPATIENT
Start: 2024-10-13 | End: 2024-10-13

## 2024-10-13 RX ORDER — TRIAMCINOLONE ACETONIDE 1 MG/G
CREAM TOPICAL 2 TIMES DAILY
Status: DISCONTINUED | OUTPATIENT
Start: 2024-10-13 | End: 2024-10-18 | Stop reason: HOSPADM

## 2024-10-13 RX ORDER — GABAPENTIN 300 MG/1
300 CAPSULE ORAL
Status: CANCELLED | OUTPATIENT
Start: 2024-10-13 | End: 2024-10-14

## 2024-10-13 RX ADMIN — NYSTATIN 500000 UNITS: 100000 SUSPENSION ORAL at 08:10

## 2024-10-13 RX ADMIN — GABAPENTIN 300 MG: 300 CAPSULE ORAL at 08:10

## 2024-10-13 RX ADMIN — PREDNISONE 60 MG: 20 TABLET ORAL at 08:10

## 2024-10-13 RX ADMIN — Medication 400 ML: at 10:10

## 2024-10-13 RX ADMIN — TRIAMCINOLONE ACETONIDE: 1 CREAM TOPICAL at 10:10

## 2024-10-13 RX ADMIN — GABAPENTIN 300 MG: 300 CAPSULE ORAL at 04:10

## 2024-10-13 RX ADMIN — SODIUM CHLORIDE 1000 ML: 9 INJECTION, SOLUTION INTRAVENOUS at 02:10

## 2024-10-13 RX ADMIN — VANCOMYCIN HYDROCHLORIDE 1750 MG: 10 INJECTION, POWDER, LYOPHILIZED, FOR SOLUTION INTRAVENOUS at 05:10

## 2024-10-13 RX ADMIN — MORPHINE SULFATE 4 MG: 4 INJECTION INTRAVENOUS at 01:10

## 2024-10-13 RX ADMIN — SENNOSIDES AND DOCUSATE SODIUM 1 TABLET: 50; 8.6 TABLET ORAL at 08:10

## 2024-10-14 PROBLEM — I77.6 VASCULITIS: Status: ACTIVE | Noted: 2024-10-14

## 2024-10-14 PROBLEM — R80.9 PROTEINURIA: Status: ACTIVE | Noted: 2024-10-14

## 2024-10-14 LAB
ALBUMIN SERPL BCP-MCNC: 2.3 G/DL (ref 3.5–5.2)
ALBUMIN/CREAT UR: 1167.9 UG/MG (ref 0–30)
ALP SERPL-CCNC: 67 U/L (ref 55–135)
ALT SERPL W/O P-5'-P-CCNC: 16 U/L (ref 10–44)
ANA SER QL IF: NORMAL
ANION GAP SERPL CALC-SCNC: 8 MMOL/L (ref 8–16)
AST SERPL-CCNC: 20 U/L (ref 10–40)
BACTERIA #/AREA URNS AUTO: ABNORMAL /HPF
BACTERIA UR CULT: NO GROWTH
BASOPHILS # BLD AUTO: 0.02 K/UL (ref 0–0.2)
BASOPHILS NFR BLD: 0.2 % (ref 0–1.9)
BILIRUB SERPL-MCNC: 0.6 MG/DL (ref 0.1–1)
BILIRUB UR QL STRIP: NEGATIVE
BILIRUB UR QL STRIP: NEGATIVE
BUN SERPL-MCNC: 19 MG/DL (ref 6–20)
CALCIUM SERPL-MCNC: 8.2 MG/DL (ref 8.7–10.5)
CHLORIDE SERPL-SCNC: 108 MMOL/L (ref 95–110)
CLARITY UR REFRACT.AUTO: ABNORMAL
CLARITY UR REFRACT.AUTO: ABNORMAL
CO2 SERPL-SCNC: 21 MMOL/L (ref 23–29)
COLOR UR AUTO: YELLOW
COLOR UR AUTO: YELLOW
CREAT SERPL-MCNC: 1 MG/DL (ref 0.5–1.4)
CREAT UR-MCNC: 162 MG/DL (ref 23–375)
CREAT UR-MCNC: 162 MG/DL (ref 23–375)
DIFFERENTIAL METHOD BLD: ABNORMAL
EOSINOPHIL # BLD AUTO: 0.1 K/UL (ref 0–0.5)
EOSINOPHIL NFR BLD: 0.8 % (ref 0–8)
ERYTHROCYTE [DISTWIDTH] IN BLOOD BY AUTOMATED COUNT: 14.4 % (ref 11.5–14.5)
EST. GFR  (NO RACE VARIABLE): >60 ML/MIN/1.73 M^2
GLUCOSE SERPL-MCNC: 114 MG/DL (ref 70–110)
GLUCOSE UR QL STRIP: NEGATIVE
GLUCOSE UR QL STRIP: NEGATIVE
HBV CORE AB SERPL QL IA: NORMAL
HBV SURFACE AB SER-ACNC: <3 MIU/ML
HBV SURFACE AB SER-ACNC: NORMAL M[IU]/ML
HCT VFR BLD AUTO: 36.4 % (ref 40–54)
HGB BLD-MCNC: 12 G/DL (ref 14–18)
HGB UR QL STRIP: ABNORMAL
HGB UR QL STRIP: ABNORMAL
HYALINE CASTS UR QL AUTO: 6 /LPF
IMM GRANULOCYTES # BLD AUTO: 0.04 K/UL (ref 0–0.04)
IMM GRANULOCYTES NFR BLD AUTO: 0.4 % (ref 0–0.5)
KETONES UR QL STRIP: ABNORMAL
KETONES UR QL STRIP: ABNORMAL
LEUKOCYTE ESTERASE UR QL STRIP: ABNORMAL
LEUKOCYTE ESTERASE UR QL STRIP: ABNORMAL
LYMPHOCYTES # BLD AUTO: 0.6 K/UL (ref 1–4.8)
LYMPHOCYTES NFR BLD: 5.1 % (ref 18–48)
MAGNESIUM SERPL-MCNC: 2 MG/DL (ref 1.6–2.6)
MCH RBC QN AUTO: 30.5 PG (ref 27–31)
MCHC RBC AUTO-ENTMCNC: 33 G/DL (ref 32–36)
MCV RBC AUTO: 93 FL (ref 82–98)
MICROALBUMIN UR DL<=1MG/L-MCNC: 1892 UG/ML
MICROSCOPIC COMMENT: ABNORMAL
MONOCYTES # BLD AUTO: 0.1 K/UL (ref 0.3–1)
MONOCYTES NFR BLD: 1 % (ref 4–15)
NEUTROPHILS # BLD AUTO: 10.2 K/UL (ref 1.8–7.7)
NEUTROPHILS NFR BLD: 92.5 % (ref 38–73)
NITRITE UR QL STRIP: NEGATIVE
NITRITE UR QL STRIP: NEGATIVE
NRBC BLD-RTO: 0 /100 WBC
OHS QRS DURATION: 72 MS
OHS QTC CALCULATION: 420 MS
PH UR STRIP: 6 [PH] (ref 5–8)
PH UR STRIP: 6 [PH] (ref 5–8)
PHOSPHATE SERPL-MCNC: 3 MG/DL (ref 2.7–4.5)
PLATELET # BLD AUTO: 410 K/UL (ref 150–450)
PMV BLD AUTO: 9.3 FL (ref 9.2–12.9)
POTASSIUM SERPL-SCNC: 4.5 MMOL/L (ref 3.5–5.1)
PROT SERPL-MCNC: 5.5 G/DL (ref 6–8.4)
PROT UR QL STRIP: ABNORMAL
PROT UR QL STRIP: ABNORMAL
PROT UR-MCNC: 258 MG/DL (ref 0–15)
PROT/CREAT UR: 1.59 MG/G{CREAT} (ref 0–0.2)
RBC # BLD AUTO: 3.93 M/UL (ref 4.6–6.2)
RBC #/AREA URNS AUTO: 18 /HPF (ref 0–4)
RHEUMATOID FACT SERPL-ACNC: <13 IU/ML (ref 0–15)
SODIUM SERPL-SCNC: 137 MMOL/L (ref 136–145)
SP GR UR STRIP: 1.02 (ref 1–1.03)
SP GR UR STRIP: 1.02 (ref 1–1.03)
SQUAMOUS #/AREA URNS AUTO: 0 /HPF
TREPONEMA PALLIDUM IGG+IGM AB [PRESENCE] IN SERUM OR PLASMA BY IMMUNOASSAY: NONREACTIVE
URN SPEC COLLECT METH UR: ABNORMAL
URN SPEC COLLECT METH UR: ABNORMAL
WBC # BLD AUTO: 11.07 K/UL (ref 3.9–12.7)
WBC #/AREA URNS AUTO: 33 /HPF (ref 0–5)

## 2024-10-14 PROCEDURE — 87086 URINE CULTURE/COLONY COUNT: CPT | Performed by: STUDENT IN AN ORGANIZED HEALTH CARE EDUCATION/TRAINING PROGRAM

## 2024-10-14 PROCEDURE — 82570 ASSAY OF URINE CREATININE: CPT | Performed by: STUDENT IN AN ORGANIZED HEALTH CARE EDUCATION/TRAINING PROGRAM

## 2024-10-14 PROCEDURE — 86431 RHEUMATOID FACTOR QUANT: CPT | Performed by: HOSPITALIST

## 2024-10-14 PROCEDURE — 99223 1ST HOSP IP/OBS HIGH 75: CPT | Mod: GC,,, | Performed by: STUDENT IN AN ORGANIZED HEALTH CARE EDUCATION/TRAINING PROGRAM

## 2024-10-14 PROCEDURE — 25000003 PHARM REV CODE 250: Performed by: STUDENT IN AN ORGANIZED HEALTH CARE EDUCATION/TRAINING PROGRAM

## 2024-10-14 PROCEDURE — 12000002 HC ACUTE/MED SURGE SEMI-PRIVATE ROOM

## 2024-10-14 PROCEDURE — 25000003 PHARM REV CODE 250: Performed by: HOSPITALIST

## 2024-10-14 PROCEDURE — 86038 ANTINUCLEAR ANTIBODIES: CPT | Performed by: HOSPITALIST

## 2024-10-14 PROCEDURE — 80053 COMPREHEN METABOLIC PANEL: CPT | Performed by: HOSPITALIST

## 2024-10-14 PROCEDURE — 83735 ASSAY OF MAGNESIUM: CPT | Performed by: HOSPITALIST

## 2024-10-14 PROCEDURE — 81001 URINALYSIS AUTO W/SCOPE: CPT | Performed by: STUDENT IN AN ORGANIZED HEALTH CARE EDUCATION/TRAINING PROGRAM

## 2024-10-14 PROCEDURE — 82043 UR ALBUMIN QUANTITATIVE: CPT | Performed by: STUDENT IN AN ORGANIZED HEALTH CARE EDUCATION/TRAINING PROGRAM

## 2024-10-14 PROCEDURE — 85025 COMPLETE CBC W/AUTO DIFF WBC: CPT | Performed by: HOSPITALIST

## 2024-10-14 PROCEDURE — 84100 ASSAY OF PHOSPHORUS: CPT | Performed by: HOSPITALIST

## 2024-10-14 PROCEDURE — 63600175 PHARM REV CODE 636 W HCPCS: Performed by: HOSPITALIST

## 2024-10-14 RX ORDER — HYDROXYZINE HYDROCHLORIDE 50 MG/1
50 TABLET, FILM COATED ORAL 4 TIMES DAILY PRN
Qty: 100 TABLET | Refills: 1 | Status: CANCELLED | OUTPATIENT
Start: 2024-10-14

## 2024-10-14 RX ORDER — GABAPENTIN 100 MG/1
100 CAPSULE ORAL 3 TIMES DAILY
Status: ON HOLD | COMMUNITY
End: 2024-10-15 | Stop reason: HOSPADM

## 2024-10-14 RX ORDER — IBUPROFEN 200 MG
400 TABLET ORAL DAILY PRN
Status: ON HOLD | COMMUNITY
End: 2024-10-15 | Stop reason: HOSPADM

## 2024-10-14 RX ORDER — NYSTATIN 100000 [USP'U]/ML
500000 SUSPENSION ORAL
Qty: 200 ML | Refills: 0 | Status: CANCELLED | OUTPATIENT
Start: 2024-10-14 | End: 2024-10-24

## 2024-10-14 RX ORDER — SILVER SULFADIAZINE 10 G/1000G
CREAM TOPICAL 2 TIMES DAILY
Status: ON HOLD | COMMUNITY
End: 2024-10-18 | Stop reason: HOSPADM

## 2024-10-14 RX ORDER — GABAPENTIN 300 MG/1
300 CAPSULE ORAL 3 TIMES DAILY
Qty: 90 CAPSULE | Refills: 11 | Status: CANCELLED | OUTPATIENT
Start: 2024-10-14 | End: 2025-10-14

## 2024-10-14 RX ORDER — PREDNISONE 20 MG/1
60 TABLET ORAL DAILY
Qty: 12 TABLET | Refills: 0 | Status: CANCELLED | OUTPATIENT
Start: 2024-10-15 | End: 2024-10-19

## 2024-10-14 RX ADMIN — GABAPENTIN 300 MG: 300 CAPSULE ORAL at 08:10

## 2024-10-14 RX ADMIN — GABAPENTIN 300 MG: 300 CAPSULE ORAL at 09:10

## 2024-10-14 RX ADMIN — Medication 400 ML: at 02:10

## 2024-10-14 RX ADMIN — NYSTATIN 500000 UNITS: 100000 SUSPENSION ORAL at 12:10

## 2024-10-14 RX ADMIN — Medication 400 ML: at 06:10

## 2024-10-14 RX ADMIN — GABAPENTIN 300 MG: 300 CAPSULE ORAL at 03:10

## 2024-10-14 RX ADMIN — Medication 400 ML: at 11:10

## 2024-10-14 RX ADMIN — LOSARTAN POTASSIUM 12.5 MG: 25 TABLET, FILM COATED ORAL at 04:10

## 2024-10-14 RX ADMIN — TRIAMCINOLONE ACETONIDE: 1 CREAM TOPICAL at 09:10

## 2024-10-14 RX ADMIN — NYSTATIN 500000 UNITS: 100000 SUSPENSION ORAL at 04:10

## 2024-10-14 RX ADMIN — NYSTATIN 500000 UNITS: 100000 SUSPENSION ORAL at 07:10

## 2024-10-14 RX ADMIN — NYSTATIN 500000 UNITS: 100000 SUSPENSION ORAL at 09:10

## 2024-10-14 RX ADMIN — PREDNISONE 60 MG: 20 TABLET ORAL at 08:10

## 2024-10-15 ENCOUNTER — TELEPHONE (OUTPATIENT)
Dept: FAMILY MEDICINE | Facility: CLINIC | Age: 59
End: 2024-10-15
Payer: MEDICAID

## 2024-10-15 LAB
ALBUMIN SERPL BCP-MCNC: 2.1 G/DL (ref 3.5–5.2)
ALP SERPL-CCNC: 73 U/L (ref 55–135)
ALT SERPL W/O P-5'-P-CCNC: 20 U/L (ref 10–44)
ANION GAP SERPL CALC-SCNC: 9 MMOL/L (ref 8–16)
APTT PPP: 26.3 SEC (ref 21–32)
APTT PPP: 26.5 SEC (ref 21–32)
AST SERPL-CCNC: 18 U/L (ref 10–40)
BACTERIA UR CULT: NO GROWTH
BASOPHILS # BLD AUTO: 0.02 K/UL (ref 0–0.2)
BASOPHILS NFR BLD: 0.1 % (ref 0–1.9)
BILIRUB SERPL-MCNC: 0.3 MG/DL (ref 0.1–1)
BUN SERPL-MCNC: 29 MG/DL (ref 6–20)
CALCIUM SERPL-MCNC: 8.9 MG/DL (ref 8.7–10.5)
CHLORIDE SERPL-SCNC: 105 MMOL/L (ref 95–110)
CO2 SERPL-SCNC: 23 MMOL/L (ref 23–29)
CREAT SERPL-MCNC: 1 MG/DL (ref 0.5–1.4)
CREAT UR-MCNC: 145 MG/DL (ref 23–375)
DIFFERENTIAL METHOD BLD: ABNORMAL
EOSINOPHIL # BLD AUTO: 0 K/UL (ref 0–0.5)
EOSINOPHIL NFR BLD: 0.1 % (ref 0–8)
ERYTHROCYTE [DISTWIDTH] IN BLOOD BY AUTOMATED COUNT: 14.4 % (ref 11.5–14.5)
EST. GFR  (NO RACE VARIABLE): >60 ML/MIN/1.73 M^2
GLUCOSE SERPL-MCNC: 166 MG/DL (ref 70–110)
HCT VFR BLD AUTO: 34 % (ref 40–54)
HGB BLD-MCNC: 11.4 G/DL (ref 14–18)
IMM GRANULOCYTES # BLD AUTO: 0.06 K/UL (ref 0–0.04)
IMM GRANULOCYTES NFR BLD AUTO: 0.3 % (ref 0–0.5)
INR PPP: 1 (ref 0.8–1.2)
LYMPHOCYTES # BLD AUTO: 0.9 K/UL (ref 1–4.8)
LYMPHOCYTES NFR BLD: 5.1 % (ref 18–48)
MAGNESIUM SERPL-MCNC: 2.3 MG/DL (ref 1.6–2.6)
MCH RBC QN AUTO: 30.8 PG (ref 27–31)
MCHC RBC AUTO-ENTMCNC: 33.5 G/DL (ref 32–36)
MCV RBC AUTO: 92 FL (ref 82–98)
MONOCYTES # BLD AUTO: 1.1 K/UL (ref 0.3–1)
MONOCYTES NFR BLD: 6.5 % (ref 4–15)
NEUTROPHILS # BLD AUTO: 15.3 K/UL (ref 1.8–7.7)
NEUTROPHILS NFR BLD: 87.9 % (ref 38–73)
NRBC BLD-RTO: 0 /100 WBC
PHOSPHATE SERPL-MCNC: 2.9 MG/DL (ref 2.7–4.5)
PLATELET # BLD AUTO: 501 K/UL (ref 150–450)
PMV BLD AUTO: 9.5 FL (ref 9.2–12.9)
POTASSIUM SERPL-SCNC: 4.3 MMOL/L (ref 3.5–5.1)
PROT SERPL-MCNC: 5.2 G/DL (ref 6–8.4)
PROT UR-MCNC: 167 MG/DL (ref 0–15)
PROT/CREAT UR: 1.15 MG/G{CREAT} (ref 0–0.2)
PROTEINASE3 IGG SER-ACNC: <0.2 U
PROTHROMBIN TIME: 11 SEC (ref 9–12.5)
RBC # BLD AUTO: 3.7 M/UL (ref 4.6–6.2)
SODIUM SERPL-SCNC: 137 MMOL/L (ref 136–145)
WBC # BLD AUTO: 17.42 K/UL (ref 3.9–12.7)

## 2024-10-15 PROCEDURE — 84156 ASSAY OF PROTEIN URINE: CPT | Performed by: HOSPITALIST

## 2024-10-15 PROCEDURE — 84100 ASSAY OF PHOSPHORUS: CPT | Performed by: HOSPITALIST

## 2024-10-15 PROCEDURE — 36415 COLL VENOUS BLD VENIPUNCTURE: CPT

## 2024-10-15 PROCEDURE — 85610 PROTHROMBIN TIME: CPT | Performed by: HOSPITALIST

## 2024-10-15 PROCEDURE — 85025 COMPLETE CBC W/AUTO DIFF WBC: CPT | Performed by: HOSPITALIST

## 2024-10-15 PROCEDURE — 25000003 PHARM REV CODE 250: Performed by: STUDENT IN AN ORGANIZED HEALTH CARE EDUCATION/TRAINING PROGRAM

## 2024-10-15 PROCEDURE — 63600175 PHARM REV CODE 636 W HCPCS: Performed by: HOSPITALIST

## 2024-10-15 PROCEDURE — 36415 COLL VENOUS BLD VENIPUNCTURE: CPT | Mod: XB | Performed by: HOSPITALIST

## 2024-10-15 PROCEDURE — 99233 SBSQ HOSP IP/OBS HIGH 50: CPT | Mod: GC,,, | Performed by: STUDENT IN AN ORGANIZED HEALTH CARE EDUCATION/TRAINING PROGRAM

## 2024-10-15 PROCEDURE — 85730 THROMBOPLASTIN TIME PARTIAL: CPT

## 2024-10-15 PROCEDURE — 82570 ASSAY OF URINE CREATININE: CPT | Performed by: HOSPITALIST

## 2024-10-15 PROCEDURE — 80053 COMPREHEN METABOLIC PANEL: CPT | Performed by: HOSPITALIST

## 2024-10-15 PROCEDURE — 83735 ASSAY OF MAGNESIUM: CPT | Performed by: HOSPITALIST

## 2024-10-15 PROCEDURE — 11000001 HC ACUTE MED/SURG PRIVATE ROOM

## 2024-10-15 PROCEDURE — 21400001 HC TELEMETRY ROOM

## 2024-10-15 PROCEDURE — 25000003 PHARM REV CODE 250: Performed by: HOSPITALIST

## 2024-10-15 PROCEDURE — 85730 THROMBOPLASTIN TIME PARTIAL: CPT | Mod: 91 | Performed by: HOSPITALIST

## 2024-10-15 RX ORDER — GABAPENTIN 300 MG/1
300 CAPSULE ORAL 3 TIMES DAILY
Qty: 90 CAPSULE | Refills: 11 | Status: SHIPPED | OUTPATIENT
Start: 2024-10-15 | End: 2024-10-18

## 2024-10-15 RX ORDER — DICLOFENAC SODIUM 10 MG/G
2 GEL TOPICAL DAILY
Status: DISCONTINUED | OUTPATIENT
Start: 2024-10-15 | End: 2024-10-18 | Stop reason: HOSPADM

## 2024-10-15 RX ORDER — PREDNISONE 10 MG/1
5 TABLET ORAL DAILY
Qty: 2 TABLET | Refills: 0 | Status: SHIPPED | OUTPATIENT
Start: 2024-11-05 | End: 2024-10-15 | Stop reason: SDUPTHER

## 2024-10-15 RX ORDER — PREDNISONE 10 MG/1
TABLET ORAL
Qty: 54 TABLET | Refills: 0 | Status: SHIPPED | OUTPATIENT
Start: 2024-10-30 | End: 2024-10-18 | Stop reason: HOSPADM

## 2024-10-15 RX ORDER — TRIAMCINOLONE ACETONIDE 1 MG/G
CREAM TOPICAL 2 TIMES DAILY
Qty: 80 G | Refills: 1 | Status: SHIPPED | OUTPATIENT
Start: 2024-10-15

## 2024-10-15 RX ORDER — PREDNISONE 20 MG/1
60 TABLET ORAL DAILY
Qty: 12 TABLET | Refills: 0 | Status: SHIPPED | OUTPATIENT
Start: 2024-10-15 | End: 2024-10-15 | Stop reason: SDUPTHER

## 2024-10-15 RX ORDER — PREDNISONE 20 MG/1
40 TABLET ORAL DAILY
Qty: 8 TABLET | Refills: 0 | Status: SHIPPED | OUTPATIENT
Start: 2024-10-20 | End: 2024-10-15 | Stop reason: SDUPTHER

## 2024-10-15 RX ORDER — ACETAMINOPHEN 500 MG
1000 TABLET ORAL EVERY 8 HOURS PRN
Status: DISCONTINUED | OUTPATIENT
Start: 2024-10-15 | End: 2024-10-18 | Stop reason: HOSPADM

## 2024-10-15 RX ORDER — HYDROXYZINE HYDROCHLORIDE 50 MG/1
50 TABLET, FILM COATED ORAL 4 TIMES DAILY PRN
Qty: 90 TABLET | Refills: 1 | Status: SHIPPED | OUTPATIENT
Start: 2024-10-15

## 2024-10-15 RX ORDER — PREDNISONE 20 MG/1
20 TABLET ORAL DAILY
Qty: 4 TABLET | Refills: 0 | Status: SHIPPED | OUTPATIENT
Start: 2024-10-25 | End: 2024-10-15 | Stop reason: SDUPTHER

## 2024-10-15 RX ORDER — NYSTATIN 100000 [USP'U]/ML
500000 SUSPENSION ORAL
Qty: 200 ML | Refills: 0 | Status: SHIPPED | OUTPATIENT
Start: 2024-10-15 | End: 2024-10-28

## 2024-10-15 RX ORDER — LOSARTAN POTASSIUM 25 MG/1
12.5 TABLET ORAL DAILY
Qty: 45 TABLET | Refills: 3 | Status: SHIPPED | OUTPATIENT
Start: 2024-10-15 | End: 2025-10-15

## 2024-10-15 RX ADMIN — SENNOSIDES AND DOCUSATE SODIUM 1 TABLET: 50; 8.6 TABLET ORAL at 09:10

## 2024-10-15 RX ADMIN — NYSTATIN 500000 UNITS: 100000 SUSPENSION ORAL at 09:10

## 2024-10-15 RX ADMIN — PREDNISONE 60 MG: 20 TABLET ORAL at 09:10

## 2024-10-15 RX ADMIN — TRIAMCINOLONE ACETONIDE: 1 CREAM TOPICAL at 09:10

## 2024-10-15 RX ADMIN — NYSTATIN 500000 UNITS: 100000 SUSPENSION ORAL at 04:10

## 2024-10-15 RX ADMIN — DICLOFENAC SODIUM 2 G: 10 GEL TOPICAL at 02:10

## 2024-10-15 RX ADMIN — Medication 400 ML: at 02:10

## 2024-10-15 RX ADMIN — GABAPENTIN 300 MG: 300 CAPSULE ORAL at 09:10

## 2024-10-15 RX ADMIN — Medication 400 ML: at 09:10

## 2024-10-15 RX ADMIN — GABAPENTIN 300 MG: 300 CAPSULE ORAL at 02:10

## 2024-10-15 RX ADMIN — LOSARTAN POTASSIUM 12.5 MG: 25 TABLET, FILM COATED ORAL at 09:10

## 2024-10-15 RX ADMIN — NYSTATIN 500000 UNITS: 100000 SUSPENSION ORAL at 11:10

## 2024-10-15 RX ADMIN — Medication 400 ML: at 06:10

## 2024-10-16 ENCOUNTER — PATIENT MESSAGE (OUTPATIENT)
Dept: RESEARCH | Facility: HOSPITAL | Age: 59
End: 2024-10-16
Payer: MEDICAID

## 2024-10-16 LAB
ALBUMIN SERPL BCP-MCNC: 2 G/DL (ref 3.5–5.2)
ALP SERPL-CCNC: 61 U/L (ref 55–135)
ALT SERPL W/O P-5'-P-CCNC: 22 U/L (ref 10–44)
ANCA AB TITR SER IF: NORMAL TITER
ANION GAP SERPL CALC-SCNC: 9 MMOL/L (ref 8–16)
ASO AB SERPL-ACNC: 61 IU/ML
AST SERPL-CCNC: 14 U/L (ref 10–40)
BASOPHILS # BLD AUTO: 0.01 K/UL (ref 0–0.2)
BASOPHILS NFR BLD: 0.1 % (ref 0–1.9)
BILIRUB SERPL-MCNC: 0.2 MG/DL (ref 0.1–1)
BUN SERPL-MCNC: 36 MG/DL (ref 6–20)
CALCIUM SERPL-MCNC: 8.4 MG/DL (ref 8.7–10.5)
CHLORIDE SERPL-SCNC: 106 MMOL/L (ref 95–110)
CO2 SERPL-SCNC: 23 MMOL/L (ref 23–29)
CREAT SERPL-MCNC: 0.9 MG/DL (ref 0.5–1.4)
DIFFERENTIAL METHOD BLD: ABNORMAL
EOSINOPHIL # BLD AUTO: 0 K/UL (ref 0–0.5)
EOSINOPHIL NFR BLD: 0.2 % (ref 0–8)
ERYTHROCYTE [DISTWIDTH] IN BLOOD BY AUTOMATED COUNT: 14.3 % (ref 11.5–14.5)
EST. GFR  (NO RACE VARIABLE): >60 ML/MIN/1.73 M^2
GLUCOSE SERPL-MCNC: 89 MG/DL (ref 70–110)
HCT VFR BLD AUTO: 32.7 % (ref 40–54)
HGB BLD-MCNC: 10.6 G/DL (ref 14–18)
IMM GRANULOCYTES # BLD AUTO: 0.05 K/UL (ref 0–0.04)
IMM GRANULOCYTES NFR BLD AUTO: 0.4 % (ref 0–0.5)
LYMPHOCYTES # BLD AUTO: 1.4 K/UL (ref 1–4.8)
LYMPHOCYTES NFR BLD: 10.5 % (ref 18–48)
MAGNESIUM SERPL-MCNC: 2.2 MG/DL (ref 1.6–2.6)
MCH RBC QN AUTO: 30.5 PG (ref 27–31)
MCHC RBC AUTO-ENTMCNC: 32.4 G/DL (ref 32–36)
MCV RBC AUTO: 94 FL (ref 82–98)
MONOCYTES # BLD AUTO: 1.1 K/UL (ref 0.3–1)
MONOCYTES NFR BLD: 8.1 % (ref 4–15)
MYELOPEROXIDASE AB SER-ACNC: 0.3 U/ML
NEUTROPHILS # BLD AUTO: 11 K/UL (ref 1.8–7.7)
NEUTROPHILS NFR BLD: 80.7 % (ref 38–73)
NRBC BLD-RTO: 0 /100 WBC
P-ANCA TITR SER IF: NORMAL TITER
PHOSPHATE SERPL-MCNC: 3.6 MG/DL (ref 2.7–4.5)
PLATELET # BLD AUTO: 489 K/UL (ref 150–450)
PMV BLD AUTO: 9.2 FL (ref 9.2–12.9)
POTASSIUM SERPL-SCNC: 3.9 MMOL/L (ref 3.5–5.1)
PROT SERPL-MCNC: 4.9 G/DL (ref 6–8.4)
RBC # BLD AUTO: 3.48 M/UL (ref 4.6–6.2)
SODIUM SERPL-SCNC: 138 MMOL/L (ref 136–145)
WBC # BLD AUTO: 13.63 K/UL (ref 3.9–12.7)

## 2024-10-16 PROCEDURE — 25000003 PHARM REV CODE 250: Performed by: HOSPITALIST

## 2024-10-16 PROCEDURE — 99233 SBSQ HOSP IP/OBS HIGH 50: CPT | Mod: GC,,, | Performed by: STUDENT IN AN ORGANIZED HEALTH CARE EDUCATION/TRAINING PROGRAM

## 2024-10-16 PROCEDURE — 84100 ASSAY OF PHOSPHORUS: CPT | Performed by: HOSPITALIST

## 2024-10-16 PROCEDURE — 36415 COLL VENOUS BLD VENIPUNCTURE: CPT | Performed by: HOSPITALIST

## 2024-10-16 PROCEDURE — 85025 COMPLETE CBC W/AUTO DIFF WBC: CPT | Performed by: HOSPITALIST

## 2024-10-16 PROCEDURE — 11000001 HC ACUTE MED/SURG PRIVATE ROOM

## 2024-10-16 PROCEDURE — 83735 ASSAY OF MAGNESIUM: CPT | Performed by: HOSPITALIST

## 2024-10-16 PROCEDURE — 80053 COMPREHEN METABOLIC PANEL: CPT | Performed by: HOSPITALIST

## 2024-10-16 PROCEDURE — 63600175 PHARM REV CODE 636 W HCPCS: Performed by: HOSPITALIST

## 2024-10-16 PROCEDURE — 25000003 PHARM REV CODE 250: Performed by: STUDENT IN AN ORGANIZED HEALTH CARE EDUCATION/TRAINING PROGRAM

## 2024-10-16 RX ADMIN — GABAPENTIN 300 MG: 300 CAPSULE ORAL at 09:10

## 2024-10-16 RX ADMIN — NYSTATIN 500000 UNITS: 100000 SUSPENSION ORAL at 04:10

## 2024-10-16 RX ADMIN — PREDNISONE 60 MG: 20 TABLET ORAL at 09:10

## 2024-10-16 RX ADMIN — NYSTATIN 500000 UNITS: 100000 SUSPENSION ORAL at 09:10

## 2024-10-16 RX ADMIN — TRIAMCINOLONE ACETONIDE: 1 CREAM TOPICAL at 11:10

## 2024-10-16 RX ADMIN — LOSARTAN POTASSIUM 12.5 MG: 25 TABLET, FILM COATED ORAL at 10:10

## 2024-10-16 RX ADMIN — Medication 400 ML: at 04:10

## 2024-10-16 RX ADMIN — GABAPENTIN 300 MG: 300 CAPSULE ORAL at 04:10

## 2024-10-16 RX ADMIN — SENNOSIDES AND DOCUSATE SODIUM 1 TABLET: 50; 8.6 TABLET ORAL at 09:10

## 2024-10-16 RX ADMIN — NYSTATIN 500000 UNITS: 100000 SUSPENSION ORAL at 11:10

## 2024-10-16 RX ADMIN — HYDROXYZINE HYDROCHLORIDE 50 MG: 25 TABLET, FILM COATED ORAL at 02:10

## 2024-10-16 RX ADMIN — NYSTATIN 500000 UNITS: 100000 SUSPENSION ORAL at 12:10

## 2024-10-16 RX ADMIN — Medication 400 ML: at 11:10

## 2024-10-16 RX ADMIN — Medication 400 ML: at 06:10

## 2024-10-16 RX ADMIN — GABAPENTIN 300 MG: 300 CAPSULE ORAL at 11:10

## 2024-10-16 RX ADMIN — TRIAMCINOLONE ACETONIDE: 1 CREAM TOPICAL at 09:10

## 2024-10-16 RX ADMIN — SENNOSIDES AND DOCUSATE SODIUM 1 TABLET: 50; 8.6 TABLET ORAL at 11:10

## 2024-10-17 ENCOUNTER — TELEPHONE (OUTPATIENT)
Dept: NEPHROLOGY | Facility: CLINIC | Age: 59
End: 2024-10-17
Payer: MEDICAID

## 2024-10-17 LAB
BASOPHILS # BLD AUTO: 0.01 K/UL (ref 0–0.2)
BASOPHILS NFR BLD: 0.1 % (ref 0–1.9)
DIFFERENTIAL METHOD BLD: ABNORMAL
EOSINOPHIL # BLD AUTO: 0.1 K/UL (ref 0–0.5)
EOSINOPHIL NFR BLD: 1.1 % (ref 0–8)
ERYTHROCYTE [DISTWIDTH] IN BLOOD BY AUTOMATED COUNT: 14.2 % (ref 11.5–14.5)
HCT VFR BLD AUTO: 34.2 % (ref 40–54)
HGB BLD-MCNC: 11.3 G/DL (ref 14–18)
IMM GRANULOCYTES # BLD AUTO: 0.05 K/UL (ref 0–0.04)
IMM GRANULOCYTES NFR BLD AUTO: 0.4 % (ref 0–0.5)
LYMPHOCYTES # BLD AUTO: 2 K/UL (ref 1–4.8)
LYMPHOCYTES NFR BLD: 17.1 % (ref 18–48)
MCH RBC QN AUTO: 30.9 PG (ref 27–31)
MCHC RBC AUTO-ENTMCNC: 33 G/DL (ref 32–36)
MCV RBC AUTO: 93 FL (ref 82–98)
MONOCYTES # BLD AUTO: 0.8 K/UL (ref 0.3–1)
MONOCYTES NFR BLD: 6.5 % (ref 4–15)
NEUTROPHILS # BLD AUTO: 8.7 K/UL (ref 1.8–7.7)
NEUTROPHILS NFR BLD: 74.8 % (ref 38–73)
NRBC BLD-RTO: 0 /100 WBC
PLATELET # BLD AUTO: 505 K/UL (ref 150–450)
PMV BLD AUTO: 9.1 FL (ref 9.2–12.9)
RBC # BLD AUTO: 3.66 M/UL (ref 4.6–6.2)
WBC # BLD AUTO: 11.6 K/UL (ref 3.9–12.7)

## 2024-10-17 PROCEDURE — 36415 COLL VENOUS BLD VENIPUNCTURE: CPT | Performed by: INTERNAL MEDICINE

## 2024-10-17 PROCEDURE — 11000001 HC ACUTE MED/SURG PRIVATE ROOM

## 2024-10-17 PROCEDURE — 50200 RENAL BIOPSY PERQ: CPT | Mod: LT,,, | Performed by: INTERNAL MEDICINE

## 2024-10-17 PROCEDURE — 63600175 PHARM REV CODE 636 W HCPCS: Mod: JZ,JG | Performed by: INTERNAL MEDICINE

## 2024-10-17 PROCEDURE — 94761 N-INVAS EAR/PLS OXIMETRY MLT: CPT

## 2024-10-17 PROCEDURE — 0TB03ZX EXCISION OF RIGHT KIDNEY, PERCUTANEOUS APPROACH, DIAGNOSTIC: ICD-10-PCS | Performed by: STUDENT IN AN ORGANIZED HEALTH CARE EDUCATION/TRAINING PROGRAM

## 2024-10-17 PROCEDURE — 25000003 PHARM REV CODE 250: Performed by: HOSPITALIST

## 2024-10-17 PROCEDURE — 50200 RENAL BIOPSY PERQ: CPT | Mod: LT | Performed by: INTERNAL MEDICINE

## 2024-10-17 PROCEDURE — 85025 COMPLETE CBC W/AUTO DIFF WBC: CPT | Performed by: INTERNAL MEDICINE

## 2024-10-17 PROCEDURE — 25000003 PHARM REV CODE 250: Performed by: STUDENT IN AN ORGANIZED HEALTH CARE EDUCATION/TRAINING PROGRAM

## 2024-10-17 PROCEDURE — 63600175 PHARM REV CODE 636 W HCPCS: Performed by: HOSPITALIST

## 2024-10-17 RX ORDER — ACETAMINOPHEN 10 MG/ML
INJECTION, SOLUTION INTRAVENOUS
Status: DISCONTINUED | OUTPATIENT
Start: 2024-10-17 | End: 2024-10-18 | Stop reason: HOSPADM

## 2024-10-17 RX ORDER — GABAPENTIN 300 MG/1
300 CAPSULE ORAL ONCE
Status: COMPLETED | OUTPATIENT
Start: 2024-10-17 | End: 2024-10-17

## 2024-10-17 RX ORDER — LIDOCAINE HYDROCHLORIDE AND EPINEPHRINE 10; 10 MG/ML; UG/ML
INJECTION, SOLUTION INFILTRATION; PERINEURAL
Status: DISCONTINUED | OUTPATIENT
Start: 2024-10-17 | End: 2024-10-17 | Stop reason: HOSPADM

## 2024-10-17 RX ADMIN — Medication 400 ML: at 01:10

## 2024-10-17 RX ADMIN — GABAPENTIN 300 MG: 300 CAPSULE ORAL at 01:10

## 2024-10-17 RX ADMIN — GABAPENTIN 300 MG: 300 CAPSULE ORAL at 08:10

## 2024-10-17 RX ADMIN — TRIAMCINOLONE ACETONIDE: 1 CREAM TOPICAL at 08:10

## 2024-10-17 RX ADMIN — NYSTATIN 500000 UNITS: 100000 SUSPENSION ORAL at 04:10

## 2024-10-17 RX ADMIN — GABAPENTIN 300 MG: 300 CAPSULE ORAL at 06:10

## 2024-10-17 RX ADMIN — PREDNISONE 60 MG: 20 TABLET ORAL at 01:10

## 2024-10-17 RX ADMIN — NYSTATIN 500000 UNITS: 100000 SUSPENSION ORAL at 01:10

## 2024-10-18 VITALS
HEART RATE: 80 BPM | RESPIRATION RATE: 17 BRPM | WEIGHT: 143 LBS | DIASTOLIC BLOOD PRESSURE: 75 MMHG | TEMPERATURE: 98 F | OXYGEN SATURATION: 97 % | SYSTOLIC BLOOD PRESSURE: 128 MMHG | HEIGHT: 66 IN | BODY MASS INDEX: 22.98 KG/M2

## 2024-10-18 LAB
BACTERIA BLD CULT: NORMAL
BACTERIA BLD CULT: NORMAL
FINAL PATHOLOGIC DIAGNOSIS: NORMAL
GROSS: NORMAL
IGA SERPL-MCNC: 268 MG/DL (ref 40–350)
Lab: NORMAL
MICROSCOPIC EXAM: NORMAL
SUPPLEMENTAL DIAGNOSIS: NORMAL

## 2024-10-18 PROCEDURE — 36415 COLL VENOUS BLD VENIPUNCTURE: CPT | Performed by: STUDENT IN AN ORGANIZED HEALTH CARE EDUCATION/TRAINING PROGRAM

## 2024-10-18 PROCEDURE — 25000003 PHARM REV CODE 250: Performed by: HOSPITALIST

## 2024-10-18 PROCEDURE — 63600175 PHARM REV CODE 636 W HCPCS: Performed by: HOSPITALIST

## 2024-10-18 PROCEDURE — 82784 ASSAY IGA/IGD/IGG/IGM EACH: CPT | Performed by: STUDENT IN AN ORGANIZED HEALTH CARE EDUCATION/TRAINING PROGRAM

## 2024-10-18 PROCEDURE — 25000003 PHARM REV CODE 250: Performed by: STUDENT IN AN ORGANIZED HEALTH CARE EDUCATION/TRAINING PROGRAM

## 2024-10-18 RX ORDER — PREDNISONE 10 MG/1
TABLET ORAL
Qty: 67 TABLET | Refills: 0 | Status: SHIPPED | OUTPATIENT
Start: 2024-10-18 | End: 2024-11-11

## 2024-10-18 RX ORDER — GABAPENTIN 300 MG/1
300 CAPSULE ORAL 3 TIMES DAILY PRN
Qty: 126 CAPSULE | Refills: 0 | Status: SHIPPED | OUTPATIENT
Start: 2024-10-18 | End: 2024-11-29

## 2024-10-18 RX ADMIN — GABAPENTIN 300 MG: 300 CAPSULE ORAL at 02:10

## 2024-10-18 RX ADMIN — TRIAMCINOLONE ACETONIDE: 1 CREAM TOPICAL at 09:10

## 2024-10-18 RX ADMIN — Medication 400 ML: at 02:10

## 2024-10-18 RX ADMIN — PREDNISONE 60 MG: 20 TABLET ORAL at 09:10

## 2024-10-18 RX ADMIN — LOSARTAN POTASSIUM 12.5 MG: 25 TABLET, FILM COATED ORAL at 09:10

## 2024-10-18 RX ADMIN — GABAPENTIN 300 MG: 300 CAPSULE ORAL at 09:10

## 2024-10-21 ENCOUNTER — TELEPHONE (OUTPATIENT)
Dept: DERMATOLOGY | Facility: CLINIC | Age: 59
End: 2024-10-21
Payer: MEDICAID

## 2024-10-21 ENCOUNTER — OFFICE VISIT (OUTPATIENT)
Dept: PRIMARY CARE CLINIC | Facility: CLINIC | Age: 59
End: 2024-10-21
Payer: MEDICAID

## 2024-10-21 VITALS
HEIGHT: 66 IN | WEIGHT: 151.13 LBS | RESPIRATION RATE: 16 BRPM | HEART RATE: 82 BPM | SYSTOLIC BLOOD PRESSURE: 143 MMHG | DIASTOLIC BLOOD PRESSURE: 89 MMHG | OXYGEN SATURATION: 98 % | BODY MASS INDEX: 24.29 KG/M2

## 2024-10-21 DIAGNOSIS — Z09 HOSPITAL DISCHARGE FOLLOW-UP: Primary | ICD-10-CM

## 2024-10-21 DIAGNOSIS — R03.0 ELEVATED BP WITHOUT DIAGNOSIS OF HYPERTENSION: ICD-10-CM

## 2024-10-21 DIAGNOSIS — R21 RASH: ICD-10-CM

## 2024-10-21 DIAGNOSIS — M79.604 LEG PAIN, BILATERAL: ICD-10-CM

## 2024-10-21 DIAGNOSIS — M79.605 LEG PAIN, BILATERAL: ICD-10-CM

## 2024-10-21 DIAGNOSIS — Z72.0 TOBACCO USE: ICD-10-CM

## 2024-10-21 PROCEDURE — 3077F SYST BP >= 140 MM HG: CPT | Mod: CPTII,,, | Performed by: FAMILY MEDICINE

## 2024-10-21 PROCEDURE — 3079F DIAST BP 80-89 MM HG: CPT | Mod: CPTII,,, | Performed by: FAMILY MEDICINE

## 2024-10-21 PROCEDURE — 99214 OFFICE O/P EST MOD 30 MIN: CPT | Mod: PBBFAC,PN | Performed by: FAMILY MEDICINE

## 2024-10-21 PROCEDURE — 99999 PR PBB SHADOW E&M-EST. PATIENT-LVL IV: CPT | Mod: PBBFAC,,, | Performed by: FAMILY MEDICINE

## 2024-10-21 PROCEDURE — G2211 COMPLEX E/M VISIT ADD ON: HCPCS | Mod: S$PBB,,, | Performed by: FAMILY MEDICINE

## 2024-10-21 PROCEDURE — 3008F BODY MASS INDEX DOCD: CPT | Mod: CPTII,,, | Performed by: FAMILY MEDICINE

## 2024-10-21 PROCEDURE — 4010F ACE/ARB THERAPY RXD/TAKEN: CPT | Mod: CPTII,,, | Performed by: FAMILY MEDICINE

## 2024-10-21 PROCEDURE — 1111F DSCHRG MED/CURRENT MED MERGE: CPT | Mod: CPTII,,, | Performed by: FAMILY MEDICINE

## 2024-10-21 PROCEDURE — 99215 OFFICE O/P EST HI 40 MIN: CPT | Mod: S$PBB,,, | Performed by: FAMILY MEDICINE

## 2024-10-21 PROCEDURE — 1159F MED LIST DOCD IN RCRD: CPT | Mod: CPTII,,, | Performed by: FAMILY MEDICINE

## 2024-10-21 RX ORDER — ACETAMINOPHEN 500 MG
1000 TABLET ORAL EVERY 6 HOURS PRN
Qty: 60 TABLET | Refills: 1 | Status: SHIPPED | OUTPATIENT
Start: 2024-10-21

## 2024-10-21 NOTE — PROGRESS NOTES
"Subjective:       Patient ID: Devyn Hazel is a 59 y.o. male.    Chief Complaint: Hospital Follow Up (Pain, swelling and burning in both feet, left is much worse - possibly caused Keflex)    59 yr old [place] male with hx of eczematous dermatitis, tobacco abuse here for hospital follow up for from 10/13-10/18 for bullous erythemaous rash that erupted some weeks prior and worsened after being prescribed keflex as outpt. Since hospitalized there was concern for a small vessel vasculitis and nephrology as well as derm consulted now s/p skin and kidney biopsies. IgA mediated reaction determined with a drug reaction(keflex) as possible contributor. Today he is accompanied by his wife using a rolling walker. Has difficulty walking due to LE swelling and leg pain L>R. Prescribed prednisone which he is on a taper for. Pain primarily when ambulating and not taking anything orally. Also using a triamcinolone cream for the past week along with petroleum jelly. Has a derm follow up in December and awaiting kidney biopsy results along with a PT apt. In a week. Frustrated that he has not been able to ambulate well for the past 6 weeks. He finds himself smoking more cigarettes lately as stuck on the couch all day.    Unfortunately he missed his derm appointment today with Leonela at [facility] to make the appointment with writer. Does not have an appointment with them now until December.             Review of Systems    Objective:      Vitals:    10/21/24 1119   BP: (!) 143/89   BP Location: Right arm   Patient Position: Sitting   Pulse: 82   Resp: 16   SpO2: 98%   Weight: 68.6 kg (151 lb 2 oz)   Height: 5' 6" (1.676 m)     Physical Exam  Vitals and nursing note reviewed.   Constitutional:       Appearance: He is well-developed.   HENT:      Head: Normocephalic and atraumatic.      Nose: Nose normal.   Eyes:      Conjunctiva/sclera: Conjunctivae normal.   Cardiovascular:      Rate and Rhythm: Normal rate and regular rhythm.      Heart " "sounds: Normal heart sounds.   Pulmonary:      Effort: Pulmonary effort is normal. No respiratory distress.      Breath sounds: Normal breath sounds. No wheezing or rales.   Abdominal:      General: There is no distension.      Palpations: Abdomen is soft.      Tenderness: There is no abdominal tenderness.   Skin:            Comments: Scrusting edematous LE rash bl in various stages of healing R>L. No erythema, induration. No active drainage.   Upper extremity signs bl of well healed similar reaction.    Neurological:      Mental Status: He is alert.      Cranial Nerves: No cranial nerve deficit.             Lab Results   Component Value Date     10/16/2024    K 3.9 10/16/2024     10/16/2024    CO2 23 10/16/2024    BUN 36 (H) 10/16/2024    CREATININE 0.9 10/16/2024    GLUCOSE 72 04/02/2021    ANIONGAP 9 10/16/2024     No results found for: "HGBA1C"  No results found for: "BNP", "BNPTRIAGEBLO"    Lab Results   Component Value Date    WBC 11.60 10/17/2024    HGB 11.3 (L) 10/17/2024    HCT 34.2 (L) 10/17/2024     (H) 10/17/2024    GRAN 8.7 (H) 10/17/2024    GRAN 74.8 (H) 10/17/2024     No results found for: "CHOL", "HDL", "LDLCALC", "TRIG"       Current Outpatient Medications:     acetaminophen (TYLENOL EXTRA STRENGTH) 500 MG tablet, Take 500 mg by mouth every 6 (six) hours as needed for Pain. , Disp: , Rfl:     atorvastatin (LIPITOR) 20 MG tablet, Take 20 mg by mouth every evening., Disp: , Rfl:     cetirizine (ZYRTEC) 10 MG tablet, Take 10 mg by mouth daily as needed for Allergies., Disp: , Rfl:     empagliflozin (JARDIANCE) 10 mg tablet, Take 1 tablet (10 mg total) by mouth once daily., Disp: 30 tablet, Rfl: 1    gabapentin (NEURONTIN) 300 MG capsule, Take 1 capsule (300 mg total) by mouth 3 (three) times daily as needed (For leg pain/burning, this medication can be discontinued when symptoms improve)., Disp: 126 capsule, Rfl: 0    hydrOXYzine (ATARAX) 50 MG tablet, Take 1 tablet (50 mg total) by " mouth 4 (four) times daily as needed for Itching or Anxiety., Disp: 90 tablet, Rfl: 1    losartan (COZAAR) 25 MG tablet, Take 0.5 tablets (12.5 mg total) by mouth once daily., Disp: 45 tablet, Rfl: 3    nystatin (MYCOSTATIN) 100,000 unit/mL suspension, Take 5 mLs (500,000 Units total) by mouth 4 (four) times daily with meals and nightly. for 10 days, Disp: 200 mL, Rfl: 0    predniSONE (DELTASONE) 10 MG tablet, Take 6 tablets (60 mg total) by mouth once daily for 3 days, THEN 4 tablets (40 mg total) once daily for 7 days, THEN 2 tablets (20 mg total) once daily for 7 days, THEN 1 tablet (10 mg total) once daily for 7 days., Disp: 67 tablet, Rfl: 0    triamcinolone acetonide 0.1% (KENALOG) 0.1 % cream, Apply topically 2 (two) times daily., Disp: 80 g, Rfl: 1    white petrolatum 41 % Oint, Apply topically as needed., Disp: 198 g, Rfl: 11    acetaminophen (TYLENOL) 500 MG tablet, Take 2 tablets (1,000 mg total) by mouth every 6 (six) hours as needed for Pain., Disp: 60 tablet, Rfl: 1        Assessment:       1. Hospital discharge follow-up    2. Rash    3. Leg pain, bilateral    4. Tobacco use    5. Elevated BP without diagnosis of hypertension           Plan:       Hospital discharge Follow up:    Discharged on 10/18/2024 with rash/biopsy results c/w small vessal vasculitis vs drug reaction(I.e keflex) taken as outpt. Persistent swelling and pain but no signs of infection and progressed healing. Pt missed his derm appt today and encouraged making new apt sooner than December as current. To continue with prednisone taper and topical triamcinolone cream for the next week.    Rash  As above. Not taking anything for pain. Acetaminophen 1 gram q 6 hours prn. Barrier cream and steroid cream with follow up in 2 weeks or sooner if needed.   Awaiting renal biopsy results as initially concern for small vessel renal involvement as well c/w vasculitis.       Leg pain, bilateral  -     acetaminophen (TYLENOL) 500 MG tablet; Take 2  tablets (1,000 mg total) by mouth every 6 (six) hours as needed for Pain.  Dispense: 60 tablet; Refill: 1  Difficulty ambulating due to pain. To see PT in another week. Encouraged leg elevation when not moving. Using rolling walker for support. Lives with wife.       Tobacco use: Chronic. One pk per day at least. Counseled on risks of tobacco use and advised cessation including common tools/meds used. Advised quit date and benefits to wound healing from cessation. Not interested in smoking cessation tools currently.     Elevated bp: No prior hx. Pt in significant discomfort primarily when ambulating. Will recheck next visit in 2 weeks.

## 2024-10-21 NOTE — TELEPHONE ENCOUNTER
59 y.o. male currently admitted for bullous purpuric eruption to lower extremities spreading upward to trunk and extremities. 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 Patient also with likely small vessel vasculitis involving the glomerulus/kidneys (elevated UPCR with hematuria and leukocyturia). Kidney biopsy performed on 10/17, in process. Patient scheduled to follow up with Dermatology today at [facility].          Final Pathologic Diagnosis     1. Skin, left medial thigh, punch biopsy:  - CHANGES MOST COMPATIBLE WITH A DRUG ERUPTION WITH ASSOCIATED DRUG-INDUCED LEUKOCYTOCLASTIC VASCULITIS.      2. Skin, left medial thigh, punch biopsy for direct immunofluorescence:  - DIRECT IMMUNOFLUORESCENCE IS PENDING FROM [facility], AND THE RESULTS WILL BE REPORTED IN AN ADDENDUM. VC      Comment: Interp By Kiarra Velasquez MD., Signed on 10/18/2024 at 14:04   Supplemental Diagnosis     Cutaneous Direct IFA, Biopsy    Interpretation  A. Left Medial Thigh, Skin biopsy, Immunofluorescence:  IgG: Negative  IgG4: Negative  IgM: Negative  IgA: Granular deposition within the walls of superficial dermal vessels  C3: Granular deposition within the walls of superficial dermal vessels  Fibrinogen: Homogeneous perivascular deposition    Impression:  Vascular fluorescence pattern compatible with IgA vasculitis, in the appropriate clinical setting (see comment)    Comment:  The granular deposition of IgA within the walls of superficial dermal vessels is compatible with IgA vasculitis in the  appropriate clinical setting. IgA vasculitis may be seen in leukocytoclastic vasculitis associated with Henoch  [id number]  purpura, inflammatory bowel disease, autoimmune connective tissue disorders (Bh2niqn syndrome, rheumatoid arthritis,  lupus erythematosus and spondyloarthropathies), malignancy (including lymphoma and multiple myeloma), urticarial  vasculitis,  or as a reaction to medications (Inna GOLDMAN et al. Am J Dermatopathol, 1999; m21(3); 234?240); as well as in  the context of a lymphocytic vasculopathy in the setting of a pigmented purpuric dermatosis, Degos disease and lupus  panniculitis (Terrell BLAS et al. J Cutan Pathol, 2002: 29: 596?601). Rarely granular deposition of IgA within cutaneous  vascular walls may be seen in nonlesional skin biopsies of patients with IgA nephropathy, alcohol use disorder, or  dermatitis herpetiformis. A final definitive diagnosis should therefore be based on correlation of the direct  immunofluorescence findings with the clinical presentation, histopathological findings on examination of sections from  formalin-fixed, paraffin-embedded tissue and other diagnostic tests.

## 2024-10-23 LAB
FINAL PATHOLOGIC DIAGNOSIS: NORMAL
GROSS: NORMAL
Lab: NORMAL

## 2024-10-25 DIAGNOSIS — R53.81 PHYSICAL DECONDITIONING: Primary | ICD-10-CM

## 2024-10-25 DIAGNOSIS — R26.9 GAIT ABNORMALITY: ICD-10-CM

## 2024-10-29 ENCOUNTER — CLINICAL SUPPORT (OUTPATIENT)
Dept: REHABILITATION | Facility: HOSPITAL | Age: 59
End: 2024-10-29
Attending: FAMILY MEDICINE
Payer: MEDICAID

## 2024-10-29 DIAGNOSIS — R53.1 DECREASED STRENGTH, ENDURANCE, AND MOBILITY: ICD-10-CM

## 2024-10-29 DIAGNOSIS — Z74.09 DECREASED STRENGTH, ENDURANCE, AND MOBILITY: ICD-10-CM

## 2024-10-29 DIAGNOSIS — R68.89 DECREASED STRENGTH, ENDURANCE, AND MOBILITY: ICD-10-CM

## 2024-10-29 DIAGNOSIS — R26.9 GAIT ABNORMALITY: ICD-10-CM

## 2024-10-29 DIAGNOSIS — R26.81 GAIT INSTABILITY: Primary | ICD-10-CM

## 2024-10-29 DIAGNOSIS — R53.81 PHYSICAL DECONDITIONING: ICD-10-CM

## 2024-10-29 PROCEDURE — 97110 THERAPEUTIC EXERCISES: CPT | Mod: PN

## 2024-10-29 PROCEDURE — 97162 PT EVAL MOD COMPLEX 30 MIN: CPT | Mod: PN

## 2024-10-30 DIAGNOSIS — N18.9 ANEMIA IN CHRONIC KIDNEY DISEASE, UNSPECIFIED CKD STAGE: Primary | ICD-10-CM

## 2024-10-30 DIAGNOSIS — D63.1 ANEMIA IN CHRONIC KIDNEY DISEASE, UNSPECIFIED CKD STAGE: Primary | ICD-10-CM

## 2024-10-31 ENCOUNTER — LAB VISIT (OUTPATIENT)
Dept: LAB | Facility: HOSPITAL | Age: 59
End: 2024-10-31
Attending: INTERNAL MEDICINE
Payer: MEDICAID

## 2024-10-31 DIAGNOSIS — N18.9 ANEMIA IN CHRONIC KIDNEY DISEASE, UNSPECIFIED CKD STAGE: ICD-10-CM

## 2024-10-31 DIAGNOSIS — D63.1 ANEMIA IN CHRONIC KIDNEY DISEASE, UNSPECIFIED CKD STAGE: ICD-10-CM

## 2024-10-31 LAB
ANION GAP SERPL CALC-SCNC: 6 MMOL/L (ref 8–16)
BASOPHILS # BLD AUTO: 0.03 K/UL (ref 0–0.2)
BASOPHILS NFR BLD: 0.2 % (ref 0–1.9)
BUN SERPL-MCNC: 20 MG/DL (ref 6–20)
CALCIUM SERPL-MCNC: 9 MG/DL (ref 8.7–10.5)
CHLORIDE SERPL-SCNC: 106 MMOL/L (ref 95–110)
CO2 SERPL-SCNC: 28 MMOL/L (ref 23–29)
CREAT SERPL-MCNC: 1 MG/DL (ref 0.5–1.4)
DIFFERENTIAL METHOD BLD: ABNORMAL
EOSINOPHIL # BLD AUTO: 0.4 K/UL (ref 0–0.5)
EOSINOPHIL NFR BLD: 2.6 % (ref 0–8)
ERYTHROCYTE [DISTWIDTH] IN BLOOD BY AUTOMATED COUNT: 17.2 % (ref 11.5–14.5)
EST. GFR  (NO RACE VARIABLE): >60 ML/MIN/1.73 M^2
GLUCOSE SERPL-MCNC: 97 MG/DL (ref 70–110)
HCT VFR BLD AUTO: 37.2 % (ref 40–54)
HGB BLD-MCNC: 11.8 G/DL (ref 14–18)
IMM GRANULOCYTES # BLD AUTO: 0.06 K/UL (ref 0–0.04)
IMM GRANULOCYTES NFR BLD AUTO: 0.4 % (ref 0–0.5)
LYMPHOCYTES # BLD AUTO: 3.2 K/UL (ref 1–4.8)
LYMPHOCYTES NFR BLD: 22.4 % (ref 18–48)
MCH RBC QN AUTO: 31.1 PG (ref 27–31)
MCHC RBC AUTO-ENTMCNC: 31.7 G/DL (ref 32–36)
MCV RBC AUTO: 98 FL (ref 82–98)
MONOCYTES # BLD AUTO: 1 K/UL (ref 0.3–1)
MONOCYTES NFR BLD: 7.2 % (ref 4–15)
NEUTROPHILS # BLD AUTO: 9.7 K/UL (ref 1.8–7.7)
NEUTROPHILS NFR BLD: 67.2 % (ref 38–73)
NRBC BLD-RTO: 0 /100 WBC
PLATELET # BLD AUTO: 441 K/UL (ref 150–450)
PMV BLD AUTO: 9.4 FL (ref 9.2–12.9)
POTASSIUM SERPL-SCNC: 4 MMOL/L (ref 3.5–5.1)
RBC # BLD AUTO: 3.8 M/UL (ref 4.6–6.2)
SODIUM SERPL-SCNC: 140 MMOL/L (ref 136–145)
WBC # BLD AUTO: 14.44 K/UL (ref 3.9–12.7)

## 2024-10-31 PROCEDURE — 36415 COLL VENOUS BLD VENIPUNCTURE: CPT | Mod: PO | Performed by: INTERNAL MEDICINE

## 2024-10-31 PROCEDURE — 85025 COMPLETE CBC W/AUTO DIFF WBC: CPT | Performed by: INTERNAL MEDICINE

## 2024-10-31 PROCEDURE — 80048 BASIC METABOLIC PNL TOTAL CA: CPT | Performed by: INTERNAL MEDICINE

## 2024-11-04 ENCOUNTER — OFFICE VISIT (OUTPATIENT)
Dept: PRIMARY CARE CLINIC | Facility: CLINIC | Age: 59
End: 2024-11-04
Payer: MEDICAID

## 2024-11-04 VITALS
SYSTOLIC BLOOD PRESSURE: 130 MMHG | BODY MASS INDEX: 24.03 KG/M2 | DIASTOLIC BLOOD PRESSURE: 78 MMHG | OXYGEN SATURATION: 98 % | RESPIRATION RATE: 18 BRPM | HEART RATE: 92 BPM | HEIGHT: 66 IN | WEIGHT: 149.5 LBS | TEMPERATURE: 98 F

## 2024-11-04 DIAGNOSIS — M79.2 NEUROPATHIC PAIN: ICD-10-CM

## 2024-11-04 DIAGNOSIS — D69.0 IGA MEDIATED LEUKOCYTOCLASTIC VASCULITIS: Primary | ICD-10-CM

## 2024-11-04 DIAGNOSIS — N02.B9 IGA NEPHROPATHY DETERMINED BY BIOPSY OF KIDNEY: ICD-10-CM

## 2024-11-04 PROCEDURE — 99999 PR PBB SHADOW E&M-EST. PATIENT-LVL V: CPT | Mod: PBBFAC,,, | Performed by: FAMILY MEDICINE

## 2024-11-04 PROCEDURE — G2211 COMPLEX E/M VISIT ADD ON: HCPCS | Mod: S$PBB,,, | Performed by: FAMILY MEDICINE

## 2024-11-04 PROCEDURE — 99215 OFFICE O/P EST HI 40 MIN: CPT | Mod: PBBFAC,PN | Performed by: FAMILY MEDICINE

## 2024-11-04 PROCEDURE — 4010F ACE/ARB THERAPY RXD/TAKEN: CPT | Mod: CPTII,,, | Performed by: FAMILY MEDICINE

## 2024-11-04 PROCEDURE — 3008F BODY MASS INDEX DOCD: CPT | Mod: CPTII,,, | Performed by: FAMILY MEDICINE

## 2024-11-04 PROCEDURE — 1159F MED LIST DOCD IN RCRD: CPT | Mod: CPTII,,, | Performed by: FAMILY MEDICINE

## 2024-11-04 PROCEDURE — 1111F DSCHRG MED/CURRENT MED MERGE: CPT | Mod: CPTII,,, | Performed by: FAMILY MEDICINE

## 2024-11-04 PROCEDURE — 3075F SYST BP GE 130 - 139MM HG: CPT | Mod: CPTII,,, | Performed by: FAMILY MEDICINE

## 2024-11-04 PROCEDURE — 99214 OFFICE O/P EST MOD 30 MIN: CPT | Mod: S$PBB,,, | Performed by: FAMILY MEDICINE

## 2024-11-04 PROCEDURE — 3078F DIAST BP <80 MM HG: CPT | Mod: CPTII,,, | Performed by: FAMILY MEDICINE

## 2024-11-04 RX ORDER — FLUCONAZOLE 200 MG/1
TABLET ORAL
COMMUNITY
Start: 2024-09-26 | End: 2024-11-04

## 2024-11-04 RX ORDER — METHYLPREDNISOLONE 4 MG/1
TABLET ORAL
COMMUNITY

## 2024-11-04 RX ORDER — SULFAMETHOXAZOLE AND TRIMETHOPRIM 800; 160 MG/1; MG/1
TABLET ORAL
COMMUNITY
End: 2024-11-07 | Stop reason: SDUPTHER

## 2024-11-04 RX ORDER — HYDROCORTISONE 25 MG/ML
LOTION TOPICAL
COMMUNITY
End: 2024-11-04

## 2024-11-04 RX ORDER — PIMECROLIMUS 10 MG/G
CREAM TOPICAL
COMMUNITY

## 2024-11-04 RX ORDER — AMMONIUM LACTATE 12 G/100G
CREAM TOPICAL
COMMUNITY
Start: 2024-09-26

## 2024-11-04 RX ORDER — HYDROXYZINE HYDROCHLORIDE 50 MG/1
TABLET, FILM COATED ORAL
COMMUNITY
End: 2024-11-04

## 2024-11-04 RX ORDER — CRISABOROLE 20 MG/G
OINTMENT TOPICAL
COMMUNITY
End: 2024-11-04

## 2024-11-04 RX ORDER — CLOBETASOL PROPIONATE 0.5 MG/G
OINTMENT TOPICAL
COMMUNITY

## 2024-11-04 RX ORDER — KETOCONAZOLE 20 MG/ML
SHAMPOO, SUSPENSION TOPICAL
COMMUNITY
Start: 2024-09-26

## 2024-11-04 RX ORDER — OXYCODONE AND ACETAMINOPHEN 5; 325 MG/1; MG/1
1 TABLET ORAL EVERY 6 HOURS PRN
COMMUNITY
Start: 2024-10-05 | End: 2024-11-04

## 2024-11-04 RX ORDER — NORTRIPTYLINE HYDROCHLORIDE 10 MG/1
10 CAPSULE ORAL NIGHTLY
Qty: 30 CAPSULE | Refills: 2 | Status: SHIPPED | OUTPATIENT
Start: 2024-11-04 | End: 2025-11-04

## 2024-11-04 RX ORDER — TRIAMCINOLONE ACETONIDE 1 MG/G
CREAM TOPICAL
COMMUNITY

## 2024-11-04 RX ORDER — DOXYCYCLINE 100 MG/1
100 CAPSULE ORAL 2 TIMES DAILY
COMMUNITY
Start: 2024-10-01 | End: 2024-11-04

## 2024-11-04 RX ORDER — CETIRIZINE HYDROCHLORIDE 10 MG/1
TABLET ORAL
COMMUNITY
Start: 2024-09-26 | End: 2024-11-04

## 2024-11-04 RX ORDER — DUPILUMAB 300 MG/2ML
INJECTION, SOLUTION SUBCUTANEOUS
COMMUNITY
Start: 2024-05-09

## 2024-11-04 RX ORDER — CLOTRIMAZOLE 1 %
CREAM (GRAM) TOPICAL
COMMUNITY
Start: 2024-09-26 | End: 2024-11-04

## 2024-11-04 RX ORDER — UPADACITINIB 15 MG/1
TABLET, EXTENDED RELEASE ORAL
COMMUNITY

## 2024-11-04 RX ORDER — MUPIROCIN 20 MG/G
OINTMENT TOPICAL
COMMUNITY
Start: 2024-05-09

## 2024-11-04 NOTE — PROGRESS NOTES
"Subjective:       Patient ID: Devyn Hazel is a 59 y.o. male.    Chief Complaint: Foot Pain (Left foot pain )    59 yr old [place] male with hx of eczematous dermatitis, tobacco abuse here for follow up on his IgA mediated vasculitis complaining of ongoing left leg pain, swelling, and poor wound healing. Left foot in particular is keeping him up with pain at night with a tingling numb sensation. Taking a 1 gram of tylenol every 8 hours with some relief.   Has follow up with nephrology in 3 days for IgA mediated nephropathy. No urinary complaints today with normal output. Has an appointment with immunology at [facility] in December but hoping to see someone sooner for his foot relief. Still weaning oral steroids from hospitalization however cannot stop using topical steroids on his legs as the pain is intolerable otherwise.   No longer using a walker and going to Pt but feels the pain and not any other disability is keeping him from being more active. If foot is not elevated it swells and exacerbates the pain.     Foot Pain      Review of Systems    Objective:      Vitals:    11/04/24 1021   BP: 130/78   BP Location: Left arm   Patient Position: Sitting   Pulse: 92   Resp: 18   Temp: 98.3 °F (36.8 °C)   TempSrc: Oral   SpO2: 98%   Weight: 67.8 kg (149 lb 7.6 oz)   Height: 5' 6" (1.676 m)     Physical Exam  Vitals and nursing note reviewed.   Constitutional:       Appearance: He is well-developed.   HENT:      Head: Normocephalic and atraumatic.      Nose: Nose normal.   Eyes:      Conjunctiva/sclera: Conjunctivae normal.   Pulmonary:      Effort: Pulmonary effort is normal. No respiratory distress.   Abdominal:      General: There is no distension.      Palpations: Abdomen is soft.   Skin:     Comments: Scattered scabs and open wounds on bl lower extremity. No drainage or erythema. Well healing in most areas with visible scarring throughout.    Neurological:      Mental Status: He is alert.      Cranial Nerves: No " "cranial nerve deficit.                   Lab Results   Component Value Date     10/31/2024    K 4.0 10/31/2024     10/31/2024    CO2 28 10/31/2024    BUN 20 10/31/2024    CREATININE 1.0 10/31/2024    GLUCOSE 72 04/02/2021    ANIONGAP 6 (L) 10/31/2024     No results found for: "HGBA1C"  No results found for: "BNP", "BNPTRIAGEBLO"    Lab Results   Component Value Date    WBC 14.44 (H) 10/31/2024    HGB 11.8 (L) 10/31/2024    HCT 37.2 (L) 10/31/2024     10/31/2024    GRAN 9.7 (H) 10/31/2024    GRAN 67.2 10/31/2024     No results found for: "CHOL", "HDL", "LDLCALC", "TRIG"       Current Outpatient Medications:     acetaminophen (TYLENOL) 500 MG tablet, Take 2 tablets (1,000 mg total) by mouth every 6 (six) hours as needed for Pain., Disp: 60 tablet, Rfl: 1    atorvastatin (LIPITOR) 20 MG tablet, Take 20 mg by mouth every evening., Disp: , Rfl:     empagliflozin (JARDIANCE) 10 mg tablet, Take 1 tablet (10 mg total) by mouth once daily., Disp: 30 tablet, Rfl: 1    gabapentin (NEURONTIN) 300 MG capsule, Take 1 capsule (300 mg total) by mouth 3 (three) times daily as needed (For leg pain/burning, this medication can be discontinued when symptoms improve)., Disp: 126 capsule, Rfl: 0    losartan (COZAAR) 25 MG tablet, Take 0.5 tablets (12.5 mg total) by mouth once daily., Disp: 45 tablet, Rfl: 3    predniSONE (DELTASONE) 10 MG tablet, Take 6 tablets (60 mg total) by mouth once daily for 3 days, THEN 4 tablets (40 mg total) once daily for 7 days, THEN 2 tablets (20 mg total) once daily for 7 days, THEN 1 tablet (10 mg total) once daily for 7 days., Disp: 67 tablet, Rfl: 0    acetaminophen (TYLENOL EXTRA STRENGTH) 500 MG tablet, Take 500 mg by mouth every 6 (six) hours as needed for Pain.  (Patient not taking: Reported on 11/4/2024), Disp: , Rfl:     ammonium lactate 12 % Crea, 1 enrique applied topically every day for 30 days (Patient not taking: Reported on 11/4/2024), Disp: , Rfl:     clobetasol 0.05% " (TEMOVATE) 0.05 % Oint, 1 enrique applied topically to bilateral elbows and feet 2 times a day to effected area for flare-ups, and then switch to triamcinolone for step down tx for 14 days (Patient not taking: Reported on 11/4/2024), Disp: , Rfl:     dupilumab (DUPIXENT PEN) 300 mg/2 mL PnIj, as directed subcutaneously every 2 weeks for 30 days (Patient not taking: Reported on 11/4/2024), Disp: , Rfl:     ketoconazole (NIZORAL) 2 % shampoo, 1 enrique applied topically every other day for 30 days (Patient not taking: Reported on 11/4/2024), Disp: , Rfl:     methylPREDNISolone (MEDROL DOSEPACK) 4 mg tablet, FOLLOW PACKAGE DIRECTIONS (Patient not taking: Reported on 11/4/2024), Disp: , Rfl:     mupirocin (BACTROBAN) 2 % ointment, 1 enrique applied topically to affeced areas 3 times a day for 14 days (Patient not taking: Reported on 11/4/2024), Disp: , Rfl:     nortriptyline (PAMELOR) 10 MG capsule, Take 1 capsule (10 mg total) by mouth every evening., Disp: 30 capsule, Rfl: 2    pimecrolimus (ELIDEL) 1 % cream, 1 nerique applied topically 2 times a day for 30 day(s) (Patient not taking: Reported on 11/4/2024), Disp: , Rfl:     sulfamethoxazole-trimethoprim 800-160mg (BACTRIM DS) 800-160 mg Tab, 1 tab(s) orally 2 times a day for 7 days (Patient not taking: Reported on 11/4/2024), Disp: , Rfl:     triamcinolone acetonide 0.1% (KENALOG) 0.1 % cream, Apply topically 2 (two) times daily. (Patient not taking: Reported on 11/4/2024), Disp: 80 g, Rfl: 1    triamcinolone acetonide 0.1% (KENALOG) 0.1 % cream, 1 enrique applied topically to affected areas on trunk and extremities 2 times a day with step down therapy as condition improves for 14 days (Patient not taking: Reported on 11/4/2024), Disp: , Rfl:     upadacitinib (RINVOQ) 15 mg 24 hr tablet, 1 tab(s) orally once a day for 30 days (Patient not taking: Reported on 11/4/2024), Disp: , Rfl:     white petrolatum 41 % Oint, Apply topically as needed. (Patient not taking: Reported on 11/4/2024),  Disp: 198 g, Rfl: 11        Assessment:       1. IgA mediated leukocytoclastic vasculitis    2. Neuropathic pain    3. IgA nephropathy determined by biopsy of kidney           Plan:       IgA mediated leukocytoclastic vasculitis  -     E-Consult to Dermatology  Ongoing bl leg pain with extensive scarring and persistent foot pain L>R. Pain resembling nerve damage primarily. Trial of nortiptyline 10 mg. Poor wound healing in several locations with no signs of infection. Unable to stop using topical triamcinolone due to pain but suspect impeding skin healing as well. Referring to derm e-consult for further guidance.       Neuropathic pain  -     nortriptyline (PAMELOR) 10 MG capsule; Take 1 capsule (10 mg total) by mouth every evening.  Dispense: 30 capsule; Refill: 2  As above    IgA nephropathy determined by biopsy of kidney  Appreciate nephrology assistance. See apt in 11/7/2024. No urinary issues currently. Urine studies reviewed.

## 2024-11-07 ENCOUNTER — OFFICE VISIT (OUTPATIENT)
Dept: NEPHROLOGY | Facility: CLINIC | Age: 59
End: 2024-11-07
Payer: MEDICAID

## 2024-11-07 VITALS
DIASTOLIC BLOOD PRESSURE: 75 MMHG | RESPIRATION RATE: 18 BRPM | WEIGHT: 146.19 LBS | HEIGHT: 66 IN | OXYGEN SATURATION: 97 % | HEART RATE: 98 BPM | SYSTOLIC BLOOD PRESSURE: 129 MMHG | BODY MASS INDEX: 23.49 KG/M2

## 2024-11-07 DIAGNOSIS — N08: Primary | ICD-10-CM

## 2024-11-07 DIAGNOSIS — I15.1 HYPERTENSION SECONDARY TO OTHER RENAL DISORDERS: ICD-10-CM

## 2024-11-07 DIAGNOSIS — D69.0 IGA MEDIATED LEUKOCYTOCLASTIC VASCULITIS: ICD-10-CM

## 2024-11-07 DIAGNOSIS — N02.B9 IGA NEPHROPATHY DETERMINED BY BIOPSY OF KIDNEY: ICD-10-CM

## 2024-11-07 DIAGNOSIS — D69.0: Primary | ICD-10-CM

## 2024-11-07 DIAGNOSIS — R80.1 PERSISTENT PROTEINURIA: ICD-10-CM

## 2024-11-07 DIAGNOSIS — N18.2 CKD (CHRONIC KIDNEY DISEASE) STAGE 2, GFR 60-89 ML/MIN: ICD-10-CM

## 2024-11-07 PROCEDURE — 99214 OFFICE O/P EST MOD 30 MIN: CPT | Mod: S$PBB,,, | Performed by: INTERNAL MEDICINE

## 2024-11-07 PROCEDURE — 99213 OFFICE O/P EST LOW 20 MIN: CPT | Mod: PBBFAC | Performed by: INTERNAL MEDICINE

## 2024-11-07 PROCEDURE — 3062F POS MACROALBUMINURIA REV: CPT | Mod: CPTII,,, | Performed by: INTERNAL MEDICINE

## 2024-11-07 PROCEDURE — 3008F BODY MASS INDEX DOCD: CPT | Mod: CPTII,,, | Performed by: INTERNAL MEDICINE

## 2024-11-07 PROCEDURE — 4010F ACE/ARB THERAPY RXD/TAKEN: CPT | Mod: CPTII,,, | Performed by: INTERNAL MEDICINE

## 2024-11-07 PROCEDURE — 3074F SYST BP LT 130 MM HG: CPT | Mod: CPTII,,, | Performed by: INTERNAL MEDICINE

## 2024-11-07 PROCEDURE — 3078F DIAST BP <80 MM HG: CPT | Mod: CPTII,,, | Performed by: INTERNAL MEDICINE

## 2024-11-07 PROCEDURE — 3066F NEPHROPATHY DOC TX: CPT | Mod: CPTII,,, | Performed by: INTERNAL MEDICINE

## 2024-11-07 PROCEDURE — 1159F MED LIST DOCD IN RCRD: CPT | Mod: CPTII,,, | Performed by: INTERNAL MEDICINE

## 2024-11-07 PROCEDURE — 1111F DSCHRG MED/CURRENT MED MERGE: CPT | Mod: CPTII,,, | Performed by: INTERNAL MEDICINE

## 2024-11-07 PROCEDURE — 99999 PR PBB SHADOW E&M-EST. PATIENT-LVL III: CPT | Mod: PBBFAC,,, | Performed by: INTERNAL MEDICINE

## 2024-11-07 RX ORDER — LOSARTAN POTASSIUM 50 MG/1
50 TABLET ORAL DAILY
Qty: 90 TABLET | Refills: 3 | Status: SHIPPED | OUTPATIENT
Start: 2024-11-07 | End: 2025-11-07

## 2024-11-07 RX ORDER — SULFAMETHOXAZOLE AND TRIMETHOPRIM 800; 160 MG/1; MG/1
1 TABLET ORAL
Qty: 30 TABLET | Refills: 1 | Status: SHIPPED | OUTPATIENT
Start: 2024-11-08

## 2024-11-07 RX ORDER — PREDNISONE 10 MG/1
20 TABLET ORAL DAILY
Qty: 180 TABLET | Refills: 1 | Status: SHIPPED | OUTPATIENT
Start: 2024-11-07 | End: 2024-11-12 | Stop reason: SDUPTHER

## 2024-11-07 RX ORDER — GABAPENTIN 300 MG/1
300 CAPSULE ORAL 3 TIMES DAILY PRN
Qty: 150 CAPSULE | Refills: 1 | Status: SHIPPED | OUTPATIENT
Start: 2024-11-07

## 2024-11-09 PROBLEM — D69.0: Status: ACTIVE | Noted: 2024-11-09

## 2024-11-09 PROBLEM — N08: Status: ACTIVE | Noted: 2024-11-09

## 2024-11-09 PROBLEM — N18.2 CKD (CHRONIC KIDNEY DISEASE) STAGE 2, GFR 60-89 ML/MIN: Status: ACTIVE | Noted: 2024-11-09

## 2024-11-09 PROBLEM — I15.1 HYPERTENSION SECONDARY TO OTHER RENAL DISORDERS: Status: ACTIVE | Noted: 2024-11-09

## 2024-11-09 NOTE — PROGRESS NOTES
Progress Report  Nephrology      Consult Requested By: PCP  Reason for Consult: Proteinuria and hematuria    History of Present Illness:  Patient is a 59 y.o. male presents for a post hospital discharge follow up visit for evaluation of newly diagnosed Henoch-Schonlein Purpura and acute IgA nephropathy     The patient reports improvement in skin rash but still discomfort and edema in feet. Urine discoloration still intermittent. Currently on a fast steroid taper.     The patient denies taking NSAIDs, recreational drugs, recent episode of dehydration, diarrhea, nausea or vomiting, or exposure to IV radiocontrast.     PAST MEDICAL HISTORY  Hyperlipidemia  IgA vasculitis      Current Outpatient Medications:     acetaminophen (TYLENOL EXTRA STRENGTH) 500 MG tablet, Take 500 mg by mouth every 6 (six) hours as needed for Pain., Disp: , Rfl:     acetaminophen (TYLENOL) 500 MG tablet, Take 2 tablets (1,000 mg total) by mouth every 6 (six) hours as needed for Pain., Disp: 60 tablet, Rfl: 1    atorvastatin (LIPITOR) 20 MG tablet, Take 20 mg by mouth every evening., Disp: , Rfl:     nortriptyline (PAMELOR) 10 MG capsule, Take 1 capsule (10 mg total) by mouth every evening., Disp: 30 capsule, Rfl: 2    ammonium lactate 12 % Crea, 1 enrique applied topically every day for 30 days (Patient not taking: Reported on 11/7/2024), Disp: , Rfl:     clobetasol 0.05% (TEMOVATE) 0.05 % Oint, 1 enrique applied topically to bilateral elbows and feet 2 times a day to effected area for flare-ups, and then switch to triamcinolone for step down tx for 14 days (Patient not taking: Reported on 11/7/2024), Disp: , Rfl:     dupilumab (DUPIXENT PEN) 300 mg/2 mL PnIj, as directed subcutaneously every 2 weeks for 30 days (Patient not taking: Reported on 11/7/2024), Disp: , Rfl:     empagliflozin (JARDIANCE) 10 mg tablet, Take 1 tablet (10 mg total) by mouth once daily., Disp: 90 tablet, Rfl: 3    gabapentin (NEURONTIN) 300 MG capsule, Take 1 capsule (300 mg  total) by mouth 3 (three) times daily as needed (For leg pain/burning, this medication can be discontinued when symptoms improve)., Disp: 150 capsule, Rfl: 1    ketoconazole (NIZORAL) 2 % shampoo, 1 enrique applied topically every other day for 30 days (Patient not taking: Reported on 11/7/2024), Disp: , Rfl:     losartan (COZAAR) 50 MG tablet, Take 1 tablet (50 mg total) by mouth once daily., Disp: 90 tablet, Rfl: 3    methylPREDNISolone (MEDROL DOSEPACK) 4 mg tablet, FOLLOW PACKAGE DIRECTIONS (Patient not taking: Reported on 11/7/2024), Disp: , Rfl:     mupirocin (BACTROBAN) 2 % ointment, 1 enrique applied topically to affeced areas 3 times a day for 14 days (Patient not taking: Reported on 11/7/2024), Disp: , Rfl:     pimecrolimus (ELIDEL) 1 % cream, 1 enrique applied topically 2 times a day for 30 day(s) (Patient not taking: Reported on 11/7/2024), Disp: , Rfl:     predniSONE (DELTASONE) 10 MG tablet, Take 2 tablets (20 mg total) by mouth once daily., Disp: 180 tablet, Rfl: 1    sulfamethoxazole-trimethoprim 800-160mg (BACTRIM DS) 800-160 mg Tab, Take 1 tablet by mouth 3 (three) times a week., Disp: 30 tablet, Rfl: 1    triamcinolone acetonide 0.1% (KENALOG) 0.1 % cream, Apply topically 2 (two) times daily. (Patient not taking: Reported on 11/7/2024), Disp: 80 g, Rfl: 1    triamcinolone acetonide 0.1% (KENALOG) 0.1 % cream, 1 enrique applied topically to affected areas on trunk and extremities 2 times a day with step down therapy as condition improves for 14 days (Patient not taking: Reported on 11/7/2024), Disp: , Rfl:     upadacitinib (RINVOQ) 15 mg 24 hr tablet, 1 tab(s) orally once a day for 30 days (Patient not taking: Reported on 11/7/2024), Disp: , Rfl:     white petrolatum 41 % Oint, Apply topically as needed. (Patient not taking: Reported on 11/7/2024), Disp: 198 g, Rfl: 11  Review of patient's allergies indicates:   Allergen Reactions    Keflex [cephalexin] Rash        Past Surgical History:   Procedure Laterality  Date    RENAL BIOPSY N/A 10/17/2024    Procedure: IN Biopsy, Kidney;  Surgeon: Rodo Taylor MD;  Location: [facility] CATH LAB;  Service: Interventional Nephrology;  Laterality: N/A;     Family History   Problem Relation Name Age of Onset    Hypertension Mother       Social History     Tobacco Use    Smoking status: Every Day     Current packs/day: 1.00     Types: Cigarettes     Passive exposure: Current    Smokeless tobacco: Never   Substance Use Topics    Alcohol use: No    Drug use: No       Review of Systems   Constitutional: Negative.    Respiratory:  Negative for shortness of breath.    Cardiovascular:  Positive for leg swelling.   Gastrointestinal:  Negative for diarrhea.   Genitourinary:  Positive for hematuria.   Skin:  Positive for rash.   All other systems reviewed and are negative.      Vitals:    11/07/24 1307   BP: 129/75   Pulse: 98   Resp: 18       PHYSICAL EXAMINATION:  General: no distress, well nourished  Skin: color, texture, turgor normal. Healing vesicular purpuric rash in legs and feet.   HEENT: Eyes: reactive pupils, normal conjunctiva. Oral mucosa moist, no ulcers.   Neck: supple, symmetrical, trachea midline, no JVD, no carotid bruit  Lungs: clear to auscultation bilaterally and normal respiratory effort  Cardiovascular: Heart: regular rate and rhythm, S1, S2 normal, no murmur, rub or gallop.  Abdomen: bowel sounds present, no abdominal bruit, soft, non-tender   Musculoskeletal: trace pitting edema in lower extremities, no clubbing or cyanosis  Lymph Nodes: No cervical or supraclavicular adenopathy  Neurologic: AAOx3, normal strength and tone. No focal deficit. No asterixis.       LABORATORY DATA:  Lab Results   Component Value Date    CREATININE 1.0 10/31/2024       Prot/Creat Ratio, Urine   Date Value Ref Range Status   10/31/2024 3.78 (H) 0.00 - 0.20 Final   10/15/2024 1.15 (H) 0.00 - 0.20 Final   10/14/2024 1.59 (H) 0.00 - 0.20 Final       Lab Results   Component Value Date      "10/31/2024    K 4.0 10/31/2024    CO2 28 10/31/2024       Lab Results   Component Value Date    CALCIUM 9.0 10/31/2024    PHOS 3.6 10/16/2024       Lab Results   Component Value Date    HGB 11.8 (L) 10/31/2024        No results found for: "HGBA1C"    No results found for: "LDLCALC"      IMAGING STUDIES  Kidney US: Reviewed  Echocardiogram: Reviewed    IMPRESSION / RECOMMENDATIONS:     1. IgA mediated leukocytoclastic vasculitis / Henoch-Schonlein purpura nephritis Began therapy with prednisone in the hospital. The dose has been rapidly tapered down to 10 mg qd in 3 weeks.  Skin lesions have markedly improved. Kidney function remains within normal limits. However, urine sediment microscopy reveals persistence of numerous dysmorphic erythrocytes, WBC casts, and lipiduria, and proteinuria has worsened to nephrotic range. Therefore, I will increase prednisone back to 20 mg qd and reassess in 4 weeks. Will explore addition of complement inhibitor iptacopan for the treatment of IgA nephropathy. Will continue PJP prophylaxis with Bactrim DS. For antiproteinuric therapy, will increase the dose of losartan from 12.5 to 50 mg qd. Will continue empagliflozin. May switch losartan to sparsentan on next visit. Renewed gabapentin for rash/edema-related pain.     predniSONE (DELTASONE) 10 MG tablet    sulfamethoxazole-trimethoprim 800-160mg (BACTRIM DS) 800-160 mg Tab    empagliflozin (JARDIANCE) 10 mg tablet    losartan (COZAAR) 50 MG tablet    gabapentin (NEURONTIN) 300 MG capsule      2. Persistent proteinuria  predniSONE (DELTASONE) 10 MG tablet    sulfamethoxazole-trimethoprim 800-160mg (BACTRIM DS) 800-160 mg Tab    empagliflozin (JARDIANCE) 10 mg tablet    losartan (COZAAR) 50 MG tablet      3. IgA nephropathy determined by biopsy of kidney  As above      4. Hypertension secondary to other renal disorders  empagliflozin (JARDIANCE) 10 mg tablet    losartan (COZAAR) 50 MG tablet      5. CKD (chronic kidney disease) stage 2, " GFR 60-89 ml/min  Due to IgAN            SUMMARY OF PLAN:  Increase prednisone to 20 mg qd  Increase losartan to 50 mg qd  Continue empagliflozin  Restart TMP-[id number]  Avoid NSAIDs  Continue gabapentin  7.   RTC in 1 mo

## 2024-11-12 DIAGNOSIS — D69.0 IGA MEDIATED LEUKOCYTOCLASTIC VASCULITIS: ICD-10-CM

## 2024-11-12 DIAGNOSIS — R80.1 PERSISTENT PROTEINURIA: ICD-10-CM

## 2024-11-15 RX ORDER — PREDNISONE 10 MG/1
20 TABLET ORAL DAILY
Qty: 180 TABLET | Refills: 1 | Status: SHIPPED | OUTPATIENT
Start: 2024-11-15

## 2024-11-19 ENCOUNTER — TELEPHONE (OUTPATIENT)
Dept: FAMILY MEDICINE | Facility: HOSPITAL | Age: 59
End: 2024-11-19
Payer: MEDICAID

## 2024-11-19 NOTE — TELEPHONE ENCOUNTER
----- Message from Haritha sent at 11/19/2024 12:14 PM CST -----  Contact: Pt [phone]  Patient is calling to see if he can be seen tomorrow 11/20/24 instead on 11/26/24.    Please call and advise.    Thank You

## 2024-11-20 ENCOUNTER — HOSPITAL ENCOUNTER (EMERGENCY)
Facility: HOSPITAL | Age: 59
Discharge: HOME | End: 2024-11-20
Attending: EMERGENCY MEDICINE
Payer: MEDICAID

## 2024-11-20 VITALS
OXYGEN SATURATION: 98 % | DIASTOLIC BLOOD PRESSURE: 85 MMHG | TEMPERATURE: 97 F | WEIGHT: 146 LBS | BODY MASS INDEX: 23.46 KG/M2 | RESPIRATION RATE: 14 BRPM | HEART RATE: 86 BPM | HEIGHT: 66 IN | SYSTOLIC BLOOD PRESSURE: 138 MMHG

## 2024-11-20 DIAGNOSIS — R21 RASH: Primary | ICD-10-CM

## 2024-11-20 LAB
ALBUMIN SERPL BCP-MCNC: 3.4 G/DL (ref 3.5–5.2)
ALP SERPL-CCNC: 93 U/L (ref 40–150)
ALT SERPL W/O P-5'-P-CCNC: 23 U/L (ref 10–44)
ANION GAP SERPL CALC-SCNC: 8 MMOL/L (ref 8–16)
AST SERPL-CCNC: 24 U/L (ref 10–40)
BACTERIA #/AREA URNS AUTO: ABNORMAL /HPF
BASOPHILS # BLD AUTO: 0.05 K/UL (ref 0–0.2)
BASOPHILS NFR BLD: 0.5 % (ref 0–1.9)
BILIRUB SERPL-MCNC: 0.5 MG/DL (ref 0.1–1)
BILIRUB UR QL STRIP: NEGATIVE
BUN SERPL-MCNC: 14 MG/DL (ref 6–20)
CALCIUM SERPL-MCNC: 9.4 MG/DL (ref 8.7–10.5)
CHLORIDE SERPL-SCNC: 106 MMOL/L (ref 95–110)
CLARITY UR REFRACT.AUTO: CLEAR
CO2 SERPL-SCNC: 23 MMOL/L (ref 23–29)
COLOR UR AUTO: YELLOW
CREAT SERPL-MCNC: 1 MG/DL (ref 0.5–1.4)
DIFFERENTIAL METHOD BLD: ABNORMAL
EOSINOPHIL # BLD AUTO: 0.3 K/UL (ref 0–0.5)
EOSINOPHIL NFR BLD: 3.2 % (ref 0–8)
ERYTHROCYTE [DISTWIDTH] IN BLOOD BY AUTOMATED COUNT: 14.7 % (ref 11.5–14.5)
EST. GFR  (NO RACE VARIABLE): >60 ML/MIN/1.73 M^2
GLUCOSE SERPL-MCNC: 82 MG/DL (ref 70–110)
GLUCOSE UR QL STRIP: ABNORMAL
HCT VFR BLD AUTO: 48 % (ref 40–54)
HGB BLD-MCNC: 16.2 G/DL (ref 14–18)
HGB UR QL STRIP: ABNORMAL
HYALINE CASTS UR QL AUTO: 33 /LPF
IMM GRANULOCYTES # BLD AUTO: 0.05 K/UL (ref 0–0.04)
IMM GRANULOCYTES NFR BLD AUTO: 0.5 % (ref 0–0.5)
KETONES UR QL STRIP: NEGATIVE
LEUKOCYTE ESTERASE UR QL STRIP: NEGATIVE
LYMPHOCYTES # BLD AUTO: 1.9 K/UL (ref 1–4.8)
LYMPHOCYTES NFR BLD: 17.6 % (ref 18–48)
MCH RBC QN AUTO: 31.2 PG (ref 27–31)
MCHC RBC AUTO-ENTMCNC: 33.8 G/DL (ref 32–36)
MCV RBC AUTO: 93 FL (ref 82–98)
MICROSCOPIC COMMENT: ABNORMAL
MONOCYTES # BLD AUTO: 0.6 K/UL (ref 0.3–1)
MONOCYTES NFR BLD: 6 % (ref 4–15)
NEUTROPHILS # BLD AUTO: 7.8 K/UL (ref 1.8–7.7)
NEUTROPHILS NFR BLD: 72.2 % (ref 38–73)
NITRITE UR QL STRIP: NEGATIVE
NRBC BLD-RTO: 0 /100 WBC
PH UR STRIP: 6 [PH] (ref 5–8)
PLATELET # BLD AUTO: 370 K/UL (ref 150–450)
PMV BLD AUTO: 9.1 FL (ref 9.2–12.9)
POTASSIUM SERPL-SCNC: 4.2 MMOL/L (ref 3.5–5.1)
PROT SERPL-MCNC: 6.8 G/DL (ref 6–8.4)
PROT UR QL STRIP: ABNORMAL
RBC # BLD AUTO: 5.19 M/UL (ref 4.6–6.2)
RBC #/AREA URNS AUTO: 76 /HPF (ref 0–4)
SODIUM SERPL-SCNC: 137 MMOL/L (ref 136–145)
SP GR UR STRIP: 1.02 (ref 1–1.03)
SQUAMOUS #/AREA URNS AUTO: 0 /HPF
URN SPEC COLLECT METH UR: ABNORMAL
WBC # BLD AUTO: 10.71 K/UL (ref 3.9–12.7)
WBC #/AREA URNS AUTO: 12 /HPF (ref 0–5)
YEAST UR QL AUTO: ABNORMAL

## 2024-11-20 PROCEDURE — 99284 EMERGENCY DEPT VISIT MOD MDM: CPT

## 2024-11-20 PROCEDURE — 81001 URINALYSIS AUTO W/SCOPE: CPT | Performed by: EMERGENCY MEDICINE

## 2024-11-20 PROCEDURE — 85025 COMPLETE CBC W/AUTO DIFF WBC: CPT | Performed by: EMERGENCY MEDICINE

## 2024-11-20 PROCEDURE — 87086 URINE CULTURE/COLONY COUNT: CPT | Performed by: EMERGENCY MEDICINE

## 2024-11-20 PROCEDURE — 80053 COMPREHEN METABOLIC PANEL: CPT | Performed by: EMERGENCY MEDICINE

## 2024-11-20 RX ORDER — NITROFURANTOIN 25; 75 MG/1; MG/1
100 CAPSULE ORAL 2 TIMES DAILY
Qty: 20 CAPSULE | Refills: 0 | Status: SHIPPED | OUTPATIENT
Start: 2024-11-20 | End: 2024-11-30

## 2024-11-20 RX ORDER — HYDROXYZINE HYDROCHLORIDE 25 MG/1
25 TABLET, FILM COATED ORAL 3 TIMES DAILY
Qty: 20 TABLET | Refills: 0 | Status: SHIPPED | OUTPATIENT
Start: 2024-11-20

## 2024-11-20 RX ORDER — TRIAMCINOLONE ACETONIDE 1 MG/G
CREAM TOPICAL 2 TIMES DAILY
Qty: 453.6 G | Refills: 0 | Status: SHIPPED | OUTPATIENT
Start: 2024-11-20 | End: 2024-12-04

## 2024-11-20 NOTE — DISCHARGE INSTRUCTIONS
Please follow up with Dermatology and Allergy as recommended.  Please restart the topical steroid ointment to the affected areas.  Make sure you continue to use Aquaphor or Vaseline to keep your skin moist.

## 2024-11-20 NOTE — ED TRIAGE NOTES
Devyn Hazel, a 59 y.o. male presents to the ED w/ complaint of rash on hands x 1 week that is causing the skin to peel. Pt states that he was seen and hospitalized for similar symptoms in October. States that physician stated that it may be a reaction to Keflex.     Triage note:  Chief Complaint   Patient presents with    skin peeling     Peeling to bilateral hands and arms.  States this happened before and he was hospitalized.      Review of patient's allergies indicates:   Allergen Reactions    Keflex [cephalexin] Rash     No past medical history on file.

## 2024-11-20 NOTE — PROVIDER PROGRESS NOTES - EMERGENCY DEPT.
Encounter Date: 11/20/2024    ED Physician Progress Notes        Case signed out to me I Dr. Lockwood's to follow up urine.  Plan is to discharge.  He has IgA nephropathy in Henoch-Schoenlein purpura.  Images of his rash and peeling is much improved compared to before.  He has not been using the steroid ointment.    Urine showed white cells and red cells.  No sinus sepsis.  Will use Macrodantin as antibiotic.  Encouraged him to take his medications.  He has follow-up with a nephrologist primary care and dermatology.  Sent triamcinolone ointment to his pharmacy.

## 2024-11-21 LAB — BACTERIA UR CULT: NO GROWTH

## 2024-11-21 NOTE — ED PROVIDER NOTES
Encounter Date: 11/20/2024       History     Chief Complaint   Patient presents with    skin peeling     Peeling to bilateral hands and arms.  States this happened before and he was hospitalized.      Devyn Hazel is a 59 M recent admission for IgA nephropathy, HSP, vasculitis here for desquamating rash to the hands.  He is currently on prednisone, previously prescribed triamcinolone ointment and atarax for pruritus.  No fevers, chills.  No active blisters.  Rash on the legs and arms improving.  He has stopped triamcinolone ointment.  He has followed up with nephrology since discharge and has an upcoming appointment with allergy per patient.  [place] [facility]       Review of patient's allergies indicates:   Allergen Reactions    Keflex [cephalexin] Rash     History reviewed. No pertinent past medical history.  Past Surgical History:   Procedure Laterality Date    RENAL BIOPSY N/A 10/17/2024    Procedure: IN Biopsy, Kidney;  Surgeon: Rodo Taylor MD;  Location: [facility] CATH LAB;  Service: Interventional Nephrology;  Laterality: N/A;     Family History   Problem Relation Name Age of Onset    Hypertension Mother       Social History     Tobacco Use    Smoking status: Every Day     Current packs/day: 1.00     Types: Cigarettes     Passive exposure: Current    Smokeless tobacco: Never   Substance Use Topics    Alcohol use: No    Drug use: No     Review of Systems    Physical Exam     Initial Vitals [11/20/24 1331]   BP Pulse Resp Temp SpO2   138/85 89 16 97.6 °F (36.4 °C) 99 %      MAP       --         Physical Exam    Nursing note and vitals reviewed.  Constitutional: He appears well-developed and well-nourished. No distress.   HENT: Mouth/Throat: Oropharynx is clear and moist.   Eyes: Conjunctivae are normal.   Neck: Neck supple.   Cardiovascular:  Normal rate, regular rhythm and intact distal pulses.           Pulmonary/Chest: Breath sounds normal. He has no wheezes. He has no rales.   Abdominal: Abdomen  is soft. Bowel sounds are normal. There is no abdominal tenderness.   Musculoskeletal:         General: No edema.      Cervical back: Neck supple.     Lymphadenopathy:     He has no cervical adenopathy.   Neurological: He is alert and oriented to person, place, and time.   Skin: No rash (desqumating rash to palms of the hands, fingers.  improved rash to the extremitites.  no evidence of superinfection) noted.   Psychiatric: He has a normal mood and affect.         ED Course   Procedures  Labs Reviewed   CBC W/ AUTO DIFFERENTIAL - Abnormal       Result Value    WBC 10.71      RBC 5.19      Hemoglobin 16.2      Hematocrit 48.0      MCV 93      MCH 31.2 (*)     MCHC 33.8      RDW 14.7 (*)     Platelets 370      MPV 9.1 (*)     Immature Granulocytes 0.5      Gran # (ANC) 7.8 (*)     Immature Grans (Abs) 0.05 (*)     Lymph # 1.9      Mono # 0.6      Eos # 0.3      Baso # 0.05      nRBC 0      Gran % 72.2      Lymph % 17.6 (*)     Mono % 6.0      Eosinophil % 3.2      Basophil % 0.5      Differential Method Automated     COMPREHENSIVE METABOLIC PANEL - Abnormal    Sodium 137      Potassium 4.2      Chloride 106      CO2 23      Glucose 82      BUN 14      Creatinine 1.0      Calcium 9.4      Total Protein 6.8      Albumin 3.4 (*)     Total Bilirubin 0.5      Alkaline Phosphatase 93      AST 24      ALT 23      eGFR >60.0      Anion Gap 8     URINALYSIS, REFLEX TO URINE CULTURE - Abnormal    Specimen UA Urine, Clean Catch      Color, UA Yellow      Appearance, UA Clear      pH, UA 6.0      Specific Gravity, UA 1.020      Protein, UA 2+ (*)     Glucose, UA 3+ (*)     Ketones, UA Negative      Bilirubin (UA) Negative      Occult Blood UA 3+ (*)     Nitrite, UA Negative      Leukocytes, UA Negative      Narrative:     Specimen Source->Urine   URINALYSIS MICROSCOPIC - Abnormal    RBC, UA 76 (*)     WBC, UA 12 (*)     Bacteria Rare      Yeast, UA None      Squam Epithel, UA 0      Hyaline Casts, UA 33 (*)     Microscopic  Comment SEE COMMENT      Narrative:     Specimen Source->Urine   CULTURE, URINE          Imaging Results    None          Medications - No data to display  Medical Decision Making  Emergent evaluation of 59 M hx above here for continued rash from IgA vasculitis.       VSS    No evidence of superimposed infection.  Rash overall improved when compared to previous pictures    Recommend restart triamcinolone ointment, continue vaseline/aquaphor.  Prescribed atarax for pruritus.     Labs overall reassuring,  turned over to oncoming team pending UA and likely discharge.  Referral to derm placed.     Amount and/or Complexity of Data Reviewed  Labs: ordered.    Risk  Prescription drug management.                                      Clinical Impression:  Final diagnoses:  [R21] Rash (Primary)          ED Disposition Condition    Discharge Stable          ED Prescriptions       Medication Sig Dispense Start Date End Date Auth. Provider    triamcinolone acetonide 0.1% (KENALOG) 0.1 % cream Apply topically 2 (two) times daily. for 14 days 453.6 g 11/20/2024 12/4/2024 Sindy Rasmussen MD    hydrOXYzine HCL (ATARAX) 25 MG tablet Take 1 tablet (25 mg total) by mouth 3 (three) times daily. 20 tablet 11/20/2024 -- Sindy Rasmussen MD    nitrofurantoin, macrocrystal-monohydrate, (MACROBID) 100 MG capsule Take 1 capsule (100 mg total) by mouth 2 (two) times daily. for 10 days 20 capsule 11/20/2024 11/30/2024 Constantino Avitia MD          Follow-up Information       Follow up With Specialties Details Why Contact Info Additional Information    Elias Guadalupe MD Family Medicine Schedule an appointment as soon as possible for a visit in 1 day  [address]  [facility] [address] [phone]       [facility] - Dermatology [address] [place] [address] Dermatology Schedule an appointment as soon as possible for a visit   [address] [facility] [address] [phone] Dermatology - Main Building, Clinic 11th Floor Please park in [address]  [place]. Use Clinic elevators 12 & 13 to get to the 11th floor    [facility] - Emergency Dept Emergency Medicine  If symptoms worsen [address]  [facility] [id number] [phone]              Sindy Rasmussen MD  11/21/24 4454

## 2024-11-26 ENCOUNTER — OFFICE VISIT (OUTPATIENT)
Dept: PRIMARY CARE CLINIC | Facility: CLINIC | Age: 59
End: 2024-11-26
Payer: MEDICAID

## 2024-11-26 VITALS
HEIGHT: 66 IN | DIASTOLIC BLOOD PRESSURE: 88 MMHG | OXYGEN SATURATION: 98 % | BODY MASS INDEX: 23.33 KG/M2 | WEIGHT: 145.19 LBS | SYSTOLIC BLOOD PRESSURE: 134 MMHG | HEART RATE: 88 BPM

## 2024-11-26 DIAGNOSIS — D69.0 IGA MEDIATED LEUKOCYTOCLASTIC VASCULITIS: Primary | ICD-10-CM

## 2024-11-26 PROCEDURE — 3066F NEPHROPATHY DOC TX: CPT | Mod: CPTII,,, | Performed by: FAMILY MEDICINE

## 2024-11-26 PROCEDURE — 3075F SYST BP GE 130 - 139MM HG: CPT | Mod: CPTII,,, | Performed by: FAMILY MEDICINE

## 2024-11-26 PROCEDURE — 99213 OFFICE O/P EST LOW 20 MIN: CPT | Mod: S$PBB,,, | Performed by: FAMILY MEDICINE

## 2024-11-26 PROCEDURE — 99214 OFFICE O/P EST MOD 30 MIN: CPT | Mod: PBBFAC,PN | Performed by: FAMILY MEDICINE

## 2024-11-26 PROCEDURE — 99999 PR PBB SHADOW E&M-EST. PATIENT-LVL IV: CPT | Mod: PBBFAC,,, | Performed by: FAMILY MEDICINE

## 2024-11-26 PROCEDURE — 3062F POS MACROALBUMINURIA REV: CPT | Mod: CPTII,,, | Performed by: FAMILY MEDICINE

## 2024-11-26 PROCEDURE — 4010F ACE/ARB THERAPY RXD/TAKEN: CPT | Mod: CPTII,,, | Performed by: FAMILY MEDICINE

## 2024-11-26 PROCEDURE — 3079F DIAST BP 80-89 MM HG: CPT | Mod: CPTII,,, | Performed by: FAMILY MEDICINE

## 2024-11-26 PROCEDURE — G2211 COMPLEX E/M VISIT ADD ON: HCPCS | Mod: S$PBB,,, | Performed by: FAMILY MEDICINE

## 2024-11-26 PROCEDURE — 1159F MED LIST DOCD IN RCRD: CPT | Mod: CPTII,,, | Performed by: FAMILY MEDICINE

## 2024-11-26 PROCEDURE — 3008F BODY MASS INDEX DOCD: CPT | Mod: CPTII,,, | Performed by: FAMILY MEDICINE

## 2024-11-26 NOTE — PROGRESS NOTES
"Subjective:       Patient ID: Devyn Hazel is a 59 y.o. male.    Chief Complaint: Follow-up    59 yr old [place] male with hx of eczematous dermatitis, tobacco abuse here for follow up on his IgA mediated vasculitis/nephropathy. Recent ER visit on 12/20 for hand desquamation and has continued triamcinolone cream.  Overall his bl leg pain R>L has improved and no longer having difficulty ambulating. Pain now is mainly at night which he describes as a burning sensation. Taking gabapentin 300 mg as as well as nortriptyline 10 mg qhs with some relief.       Review of Systems    Objective:      Vitals:    11/26/24 1021   BP: 134/88   Pulse: 88   SpO2: 98%   Weight: 65.9 kg (145 lb 2.8 oz)   Height: 5' 6" (1.676 m)     Physical Exam  Vitals and nursing note reviewed.   Constitutional:       Appearance: He is well-developed. He is not ill-appearing.   HENT:      Head: Normocephalic and atraumatic.      Nose: Nose normal.   Eyes:      Conjunctiva/sclera: Conjunctivae normal.   Cardiovascular:      Rate and Rhythm: Normal rate and regular rhythm.      Heart sounds: Normal heart sounds.   Pulmonary:      Effort: Pulmonary effort is normal. No respiratory distress.      Breath sounds: Normal breath sounds.   Abdominal:      General: There is no distension.      Palpations: Abdomen is soft.      Tenderness: There is no abdominal tenderness.   Skin:            Comments: Extensive scarring from prior ulcerations scattered across bl  lower extremity. No drainage or erythema.     Bl hand skin desquamation sequela. Skin intact. No erythema.     Neurological:      Mental Status: He is alert.      Cranial Nerves: No cranial nerve deficit.   Psychiatric:         Mood and Affect: Mood normal.                   Lab Results   Component Value Date     11/20/2024    K 4.2 11/20/2024     11/20/2024    CO2 23 11/20/2024    BUN 14 11/20/2024    CREATININE 1.0 11/20/2024    GLUCOSE 72 04/02/2021    ANIONGAP 8 11/20/2024     No " "results found for: "HGBA1C"  No results found for: "BNP", "BNPTRIAGEBLO"    Lab Results   Component Value Date    WBC 10.71 11/20/2024    HGB 16.2 11/20/2024    HCT 48.0 11/20/2024     11/20/2024    GRAN 7.8 (H) 11/20/2024    GRAN 72.2 11/20/2024     No results found for: "CHOL", "HDL", "LDLCALC", "TRIG"       Current Outpatient Medications:     acetaminophen (TYLENOL EXTRA STRENGTH) 500 MG tablet, Take 500 mg by mouth every 6 (six) hours as needed for Pain., Disp: , Rfl:     acetaminophen (TYLENOL) 500 MG tablet, Take 2 tablets (1,000 mg total) by mouth every 6 (six) hours as needed for Pain., Disp: 60 tablet, Rfl: 1    atorvastatin (LIPITOR) 20 MG tablet, Take 20 mg by mouth every evening., Disp: , Rfl:     empagliflozin (JARDIANCE) 10 mg tablet, Take 1 tablet (10 mg total) by mouth once daily., Disp: 90 tablet, Rfl: 3    gabapentin (NEURONTIN) 300 MG capsule, Take 1 capsule (300 mg total) by mouth 3 (three) times daily as needed (For leg pain/burning, this medication can be discontinued when symptoms improve)., Disp: 150 capsule, Rfl: 1    hydrOXYzine HCL (ATARAX) 25 MG tablet, Take 1 tablet (25 mg total) by mouth 3 (three) times daily., Disp: 20 tablet, Rfl: 0    losartan (COZAAR) 50 MG tablet, Take 1 tablet (50 mg total) by mouth once daily., Disp: 90 tablet, Rfl: 3    mupirocin (BACTROBAN) 2 % ointment, , Disp: , Rfl:     nitrofurantoin, macrocrystal-monohydrate, (MACROBID) 100 MG capsule, Take 1 capsule (100 mg total) by mouth 2 (two) times daily. for 10 days, Disp: 20 capsule, Rfl: 0    nortriptyline (PAMELOR) 10 MG capsule, Take 1 capsule (10 mg total) by mouth every evening., Disp: 30 capsule, Rfl: 2    pimecrolimus (ELIDEL) 1 % cream, , Disp: , Rfl:     predniSONE (DELTASONE) 10 MG tablet, Take 2 tablets (20 mg total) by mouth once daily., Disp: 180 tablet, Rfl: 1    sulfamethoxazole-trimethoprim 800-160mg (BACTRIM DS) 800-160 mg Tab, Take 1 tablet by mouth 3 (three) times a week., Disp: 30 tablet, " Rfl: 1    triamcinolone acetonide 0.1% (KENALOG) 0.1 % cream, Apply topically 2 (two) times daily., Disp: 80 g, Rfl: 1    triamcinolone acetonide 0.1% (KENALOG) 0.1 % cream, Apply topically 2 (two) times daily. for 14 days, Disp: 453.6 g, Rfl: 0    white petrolatum 41 % Oint, Apply topically as needed. (Patient not taking: Reported on 11/7/2024), Disp: 198 g, Rfl: 11        Assessment:       1. IgA mediated leukocytoclastic vasculitis           Plan:       IgA mediated leukocytoclastic vasculitis    Recent visits with nephrology and ER with ongoing pain and desquamation. Recent increase in prednisone to 20 mg daily with taper discontinued. Triamcinolone for bl hands has been beneficial along with vaseline.   Taking bactrim and jardiance per nephrology.  Missed prior apt with derm at [facility] due to double booking writer but has new upcoming apt he is reminded of on 12/2.   He is encouraged of progress although significant discomfort persists. He will continue gabapentin 300 mg tid along with nortriptyline 10 mg qhs currently. Follow up with writer in a month.

## 2024-12-18 ENCOUNTER — LAB VISIT (OUTPATIENT)
Dept: LAB | Facility: HOSPITAL | Age: 59
End: 2024-12-18
Attending: INTERNAL MEDICINE
Payer: MEDICAID

## 2024-12-18 ENCOUNTER — OFFICE VISIT (OUTPATIENT)
Dept: NEPHROLOGY | Facility: CLINIC | Age: 59
End: 2024-12-18
Payer: MEDICAID

## 2024-12-18 VITALS
WEIGHT: 144.38 LBS | DIASTOLIC BLOOD PRESSURE: 84 MMHG | RESPIRATION RATE: 18 BRPM | SYSTOLIC BLOOD PRESSURE: 131 MMHG | HEIGHT: 66 IN | BODY MASS INDEX: 23.2 KG/M2 | OXYGEN SATURATION: 97 % | HEART RATE: 78 BPM

## 2024-12-18 DIAGNOSIS — N18.2 CKD (CHRONIC KIDNEY DISEASE) STAGE 2, GFR 60-89 ML/MIN: ICD-10-CM

## 2024-12-18 DIAGNOSIS — D69.0: ICD-10-CM

## 2024-12-18 DIAGNOSIS — I15.1 HYPERTENSION SECONDARY TO OTHER RENAL DISORDERS: ICD-10-CM

## 2024-12-18 DIAGNOSIS — D69.0 HENOCH-SCHONLEIN PURPURA: ICD-10-CM

## 2024-12-18 DIAGNOSIS — D69.0 HENOCH-SCHONLEIN PURPURA: Primary | ICD-10-CM

## 2024-12-18 DIAGNOSIS — R80.1 PERSISTENT PROTEINURIA: ICD-10-CM

## 2024-12-18 DIAGNOSIS — N08: ICD-10-CM

## 2024-12-18 LAB
BACTERIA #/AREA URNS AUTO: ABNORMAL /HPF
BILIRUB UR QL STRIP: NEGATIVE
CLARITY UR REFRACT.AUTO: CLEAR
COLOR UR AUTO: YELLOW
CREAT UR-MCNC: 110 MG/DL (ref 23–375)
GLUCOSE UR QL STRIP: ABNORMAL
HGB UR QL STRIP: ABNORMAL
HYALINE CASTS UR QL AUTO: 1 /LPF
KETONES UR QL STRIP: NEGATIVE
LEUKOCYTE ESTERASE UR QL STRIP: NEGATIVE
MICROSCOPIC COMMENT: ABNORMAL
NITRITE UR QL STRIP: NEGATIVE
PH UR STRIP: 6 [PH] (ref 5–8)
PROT UR QL STRIP: ABNORMAL
PROT UR-MCNC: 119 MG/DL (ref 0–15)
PROT/CREAT UR: 1.08 MG/G{CREAT} (ref 0–0.2)
RBC #/AREA URNS AUTO: 27 /HPF (ref 0–4)
SP GR UR STRIP: 1.02 (ref 1–1.03)
SQUAMOUS #/AREA URNS AUTO: 0 /HPF
URN SPEC COLLECT METH UR: ABNORMAL
WBC #/AREA URNS AUTO: 6 /HPF (ref 0–5)
YEAST UR QL AUTO: ABNORMAL

## 2024-12-18 PROCEDURE — 3075F SYST BP GE 130 - 139MM HG: CPT | Mod: CPTII,,, | Performed by: INTERNAL MEDICINE

## 2024-12-18 PROCEDURE — 3008F BODY MASS INDEX DOCD: CPT | Mod: CPTII,,, | Performed by: INTERNAL MEDICINE

## 2024-12-18 PROCEDURE — 3062F POS MACROALBUMINURIA REV: CPT | Mod: CPTII,,, | Performed by: INTERNAL MEDICINE

## 2024-12-18 PROCEDURE — 3066F NEPHROPATHY DOC TX: CPT | Mod: CPTII,,, | Performed by: INTERNAL MEDICINE

## 2024-12-18 PROCEDURE — 3079F DIAST BP 80-89 MM HG: CPT | Mod: CPTII,,, | Performed by: INTERNAL MEDICINE

## 2024-12-18 PROCEDURE — 99999 PR PBB SHADOW E&M-EST. PATIENT-LVL III: CPT | Mod: PBBFAC,,, | Performed by: INTERNAL MEDICINE

## 2024-12-18 PROCEDURE — 84156 ASSAY OF PROTEIN URINE: CPT | Performed by: INTERNAL MEDICINE

## 2024-12-18 PROCEDURE — 4010F ACE/ARB THERAPY RXD/TAKEN: CPT | Mod: CPTII,,, | Performed by: INTERNAL MEDICINE

## 2024-12-18 PROCEDURE — 1159F MED LIST DOCD IN RCRD: CPT | Mod: CPTII,,, | Performed by: INTERNAL MEDICINE

## 2024-12-18 PROCEDURE — 81001 URINALYSIS AUTO W/SCOPE: CPT | Performed by: INTERNAL MEDICINE

## 2024-12-18 PROCEDURE — 99213 OFFICE O/P EST LOW 20 MIN: CPT | Mod: PBBFAC | Performed by: INTERNAL MEDICINE

## 2024-12-18 PROCEDURE — 99214 OFFICE O/P EST MOD 30 MIN: CPT | Mod: S$PBB,,, | Performed by: INTERNAL MEDICINE

## 2024-12-18 NOTE — PROGRESS NOTES
Progress Report  Nephrology      Consult Requested By: PCP  Reason for Consult: Proteinuria and hematuria    History of Present Illness:  Patient is a 59 y.o. male presents for a post hospital discharge follow up visit for evaluation of newly diagnosed Henoch-Schonlein Purpura and acute IgA nephropathy     The patient reports improvement in skin rash but still discomfort and edema in feet. Urine discoloration still intermittent. Currently on a fast steroid taper.     The patient denies taking NSAIDs, recreational drugs, recent episode of dehydration, diarrhea, nausea or vomiting, or exposure to IV radiocontrast.     PAST MEDICAL HISTORY  Hyperlipidemia  IgA vasculitis      Current Outpatient Medications:     acetaminophen (TYLENOL EXTRA STRENGTH) 500 MG tablet, Take 500 mg by mouth every 6 (six) hours as needed for Pain., Disp: , Rfl:     acetaminophen (TYLENOL) 500 MG tablet, Take 2 tablets (1,000 mg total) by mouth every 6 (six) hours as needed for Pain., Disp: 60 tablet, Rfl: 1    atorvastatin (LIPITOR) 20 MG tablet, Take 20 mg by mouth every evening., Disp: , Rfl:     empagliflozin (JARDIANCE) 10 mg tablet, Take 1 tablet (10 mg total) by mouth once daily., Disp: 90 tablet, Rfl: 3    hydrOXYzine HCL (ATARAX) 25 MG tablet, Take 1 tablet (25 mg total) by mouth 3 (three) times daily., Disp: 20 tablet, Rfl: 0    losartan (COZAAR) 50 MG tablet, Take 1 tablet (50 mg total) by mouth once daily., Disp: 90 tablet, Rfl: 3    mupirocin (BACTROBAN) 2 % ointment, , Disp: , Rfl:     nortriptyline (PAMELOR) 10 MG capsule, Take 1 capsule (10 mg total) by mouth every evening., Disp: 30 capsule, Rfl: 2    pimecrolimus (ELIDEL) 1 % cream, , Disp: , Rfl:     predniSONE (DELTASONE) 10 MG tablet, Take 2 tablets (20 mg total) by mouth once daily., Disp: 180 tablet, Rfl: 1    sulfamethoxazole-trimethoprim 800-160mg (BACTRIM DS) 800-160 mg Tab, Take 1 tablet by mouth 3 (three) times a week., Disp: 30 tablet, Rfl: 1    triamcinolone  acetonide 0.1% (KENALOG) 0.1 % cream, Apply topically 2 (two) times daily., Disp: 80 g, Rfl: 1    gabapentin (NEURONTIN) 300 MG capsule, Take 1 capsule (300 mg total) by mouth 3 (three) times daily as needed (For leg pain/burning, this medication can be discontinued when symptoms improve). (Patient not taking: Reported on 12/18/2024), Disp: 150 capsule, Rfl: 1    white petrolatum 41 % Oint, Apply topically as needed. (Patient not taking: Reported on 12/18/2024), Disp: 198 g, Rfl: 11  Review of patient's allergies indicates:   Allergen Reactions    Keflex [cephalexin] Rash        Past Surgical History:   Procedure Laterality Date    RENAL BIOPSY N/A 10/17/2024    Procedure: IN Biopsy, Kidney;  Surgeon: Rodo Taylor MD;  Location: [facility] CATH LAB;  Service: Interventional Nephrology;  Laterality: N/A;     Family History   Problem Relation Name Age of Onset    Hypertension Mother       Social History     Tobacco Use    Smoking status: Every Day     Current packs/day: 1.00     Types: Cigarettes     Passive exposure: Current    Smokeless tobacco: Never   Substance Use Topics    Alcohol use: No    Drug use: No       Review of Systems   Constitutional: Negative.    Respiratory:  Negative for shortness of breath.    Cardiovascular:  Positive for leg swelling.   Gastrointestinal:  Negative for diarrhea.   Genitourinary:  Positive for hematuria.   Skin:  Positive for rash.   All other systems reviewed and are negative.      Vitals:    12/18/24 1528   BP: 131/84   Pulse: 78   Resp: 18       PHYSICAL EXAMINATION:  General: no distress, well nourished  Skin: color, texture, turgor normal. Healing vesicular purpuric rash in legs and feet.   HEENT: Eyes: reactive pupils, normal conjunctiva. Oral mucosa moist, no ulcers.   Neck: supple, symmetrical, trachea midline, no JVD, no carotid bruit  Lungs: clear to auscultation bilaterally and normal respiratory effort  Cardiovascular: Heart: regular rate and rhythm, S1, S2 normal, no  "murmur, rub or gallop.  Abdomen: bowel sounds present, no abdominal bruit, soft, non-tender   Musculoskeletal: trace pitting edema in lower extremities, no clubbing or cyanosis  Lymph Nodes: No cervical or supraclavicular adenopathy  Neurologic: AAOx3, normal strength and tone. No focal deficit. No asterixis.       LABORATORY DATA:  Lab Results   Component Value Date    CREATININE 1.0 11/20/2024       Prot/Creat Ratio, Urine   Date Value Ref Range Status   10/31/2024 3.78 (H) 0.00 - 0.20 Final   10/15/2024 1.15 (H) 0.00 - 0.20 Final   10/14/2024 1.59 (H) 0.00 - 0.20 Final       Lab Results   Component Value Date     11/20/2024    K 4.2 11/20/2024    CO2 23 11/20/2024       Lab Results   Component Value Date    CALCIUM 9.4 11/20/2024    PHOS 3.6 10/16/2024       Lab Results   Component Value Date    HGB 15.2 12/02/2024        No results found for: "HGBA1C"    No results found for: "LDLCALC"      IMAGING STUDIES  Kidney US: Reviewed  Echocardiogram: Reviewed    IMPRESSION / RECOMMENDATIONS:     1. IgA mediated leukocytoclastic vasculitis / Henoch-Schonlein purpura nephritis Began therapy with prednisone in the hospital. The dose has been rapidly tapered down to 10 mg qd in 3 weeks.  Skin lesions have markedly improved. Kidney function remains within normal limits. However, urine sediment microscopy reveals persistence of numerous dysmorphic erythrocytes, WBC casts, and lipiduria, and proteinuria has worsened to nephrotic range. Therefore, I increased prednisone back to 20 mg qd and reassess in 4 weeks. Will explore addition of complement inhibitor iptacopan for the treatment of IgA nephropathy. Will continue PJP prophylaxis with Bactrim DS. For antiproteinuric therapy, will increase the dose of losartan from 12.5 to 50 mg qd. Will continue empagliflozin. May switch losartan to sparsentan on next visit. Stopped gabapentin for rash/edema-related pain (resolved)    predniSONE (DELTASONE) 10 MG tablet    " sulfamethoxazole-trimethoprim 800-160mg (BACTRIM DS) 800-160 mg Tab    empagliflozin (JARDIANCE) 10 mg tablet    losartan (COZAAR) 50 MG tablet    gabapentin (NEURONTIN) 300 MG capsule      2. Persistent proteinuria  predniSONE (DELTASONE) 10 MG tablet    sulfamethoxazole-trimethoprim 800-160mg (BACTRIM DS) 800-160 mg Tab    empagliflozin (JARDIANCE) 10 mg tablet    losartan (COZAAR) 50 MG tablet      3. IgA nephropathy determined by biopsy of kidney  As above      4. Hypertension secondary to other renal disorders  empagliflozin (JARDIANCE) 10 mg tablet    losartan (COZAAR) 50 MG tablet      5. CKD (chronic kidney disease) stage 2, GFR 60-89 ml/min  Due to IgAN            SUMMARY OF PLAN:  Continue prednisone 20 mg qd  Continue losartan 50 mg qd  Continue empagliflozin  Continue TMP-SXZ  Avoid NSAIDs  Recheck BMP, UA, UPCR today  7.   RTC in 1 mo

## 2025-01-06 ENCOUNTER — TELEPHONE (OUTPATIENT)
Dept: NEPHROLOGY | Facility: CLINIC | Age: 60
End: 2025-01-06
Payer: MEDICAID

## 2025-01-06 ENCOUNTER — LAB VISIT (OUTPATIENT)
Dept: LAB | Facility: HOSPITAL | Age: 60
End: 2025-01-06
Attending: INTERNAL MEDICINE
Payer: MEDICAID

## 2025-01-06 DIAGNOSIS — N18.2 CKD (CHRONIC KIDNEY DISEASE) STAGE 2, GFR 60-89 ML/MIN: ICD-10-CM

## 2025-01-06 DIAGNOSIS — N18.2 CKD (CHRONIC KIDNEY DISEASE) STAGE 2, GFR 60-89 ML/MIN: Primary | ICD-10-CM

## 2025-01-06 LAB
ALBUMIN SERPL BCP-MCNC: 3.2 G/DL (ref 3.5–5.2)
ANION GAP SERPL CALC-SCNC: 8 MMOL/L (ref 8–16)
BUN SERPL-MCNC: 19 MG/DL (ref 6–20)
CALCIUM SERPL-MCNC: 8.8 MG/DL (ref 8.7–10.5)
CHLORIDE SERPL-SCNC: 107 MMOL/L (ref 95–110)
CO2 SERPL-SCNC: 25 MMOL/L (ref 23–29)
CREAT SERPL-MCNC: 0.9 MG/DL (ref 0.5–1.4)
EST. GFR  (NO RACE VARIABLE): >60 ML/MIN/1.73 M^2
GLUCOSE SERPL-MCNC: 73 MG/DL (ref 70–110)
PHOSPHATE SERPL-MCNC: 3 MG/DL (ref 2.7–4.5)
POTASSIUM SERPL-SCNC: 4 MMOL/L (ref 3.5–5.1)
SODIUM SERPL-SCNC: 140 MMOL/L (ref 136–145)

## 2025-01-06 PROCEDURE — 85025 COMPLETE CBC W/AUTO DIFF WBC: CPT | Performed by: INTERNAL MEDICINE

## 2025-01-06 PROCEDURE — 36415 COLL VENOUS BLD VENIPUNCTURE: CPT | Mod: PO | Performed by: INTERNAL MEDICINE

## 2025-01-06 PROCEDURE — 80069 RENAL FUNCTION PANEL: CPT | Performed by: INTERNAL MEDICINE

## 2025-01-07 LAB
BASOPHILS # BLD AUTO: 0.03 K/UL (ref 0–0.2)
BASOPHILS NFR BLD: 0.2 % (ref 0–1.9)
DIFFERENTIAL METHOD BLD: ABNORMAL
EOSINOPHIL # BLD AUTO: 0.4 K/UL (ref 0–0.5)
EOSINOPHIL NFR BLD: 3 % (ref 0–8)
ERYTHROCYTE [DISTWIDTH] IN BLOOD BY AUTOMATED COUNT: 14.8 % (ref 11.5–14.5)
HCT VFR BLD AUTO: 45.4 % (ref 40–54)
HGB BLD-MCNC: 14.8 G/DL (ref 14–18)
IMM GRANULOCYTES # BLD AUTO: 0.05 K/UL (ref 0–0.04)
IMM GRANULOCYTES NFR BLD AUTO: 0.4 % (ref 0–0.5)
LYMPHOCYTES # BLD AUTO: 3.3 K/UL (ref 1–4.8)
LYMPHOCYTES NFR BLD: 26.4 % (ref 18–48)
MCH RBC QN AUTO: 31.2 PG (ref 27–31)
MCHC RBC AUTO-ENTMCNC: 32.6 G/DL (ref 32–36)
MCV RBC AUTO: 96 FL (ref 82–98)
MONOCYTES # BLD AUTO: 1.1 K/UL (ref 0.3–1)
MONOCYTES NFR BLD: 8.6 % (ref 4–15)
NEUTROPHILS # BLD AUTO: 7.7 K/UL (ref 1.8–7.7)
NEUTROPHILS NFR BLD: 61.4 % (ref 38–73)
NRBC BLD-RTO: 0 /100 WBC
PLATELET # BLD AUTO: 347 K/UL (ref 150–450)
PMV BLD AUTO: 10.1 FL (ref 9.2–12.9)
RBC # BLD AUTO: 4.75 M/UL (ref 4.6–6.2)
WBC # BLD AUTO: 12.47 K/UL (ref 3.9–12.7)

## 2025-01-28 ENCOUNTER — OFFICE VISIT (OUTPATIENT)
Dept: NEPHROLOGY | Facility: CLINIC | Age: 60
End: 2025-01-28
Payer: MEDICAID

## 2025-01-28 VITALS
BODY MASS INDEX: 23.53 KG/M2 | DIASTOLIC BLOOD PRESSURE: 81 MMHG | HEART RATE: 92 BPM | SYSTOLIC BLOOD PRESSURE: 127 MMHG | RESPIRATION RATE: 18 BRPM | HEIGHT: 66 IN | OXYGEN SATURATION: 96 % | WEIGHT: 146.38 LBS

## 2025-01-28 DIAGNOSIS — N08: ICD-10-CM

## 2025-01-28 DIAGNOSIS — N18.2 CKD (CHRONIC KIDNEY DISEASE) STAGE 2, GFR 60-89 ML/MIN: ICD-10-CM

## 2025-01-28 DIAGNOSIS — R80.1 PERSISTENT PROTEINURIA: ICD-10-CM

## 2025-01-28 DIAGNOSIS — N02.B9 IGA NEPHROPATHY DETERMINED BY BIOPSY OF KIDNEY: Primary | ICD-10-CM

## 2025-01-28 DIAGNOSIS — I15.1 HYPERTENSION SECONDARY TO OTHER RENAL DISORDERS: ICD-10-CM

## 2025-01-28 DIAGNOSIS — D69.0: ICD-10-CM

## 2025-01-28 PROCEDURE — 4010F ACE/ARB THERAPY RXD/TAKEN: CPT | Mod: CPTII,,, | Performed by: INTERNAL MEDICINE

## 2025-01-28 PROCEDURE — 3066F NEPHROPATHY DOC TX: CPT | Mod: CPTII,,, | Performed by: INTERNAL MEDICINE

## 2025-01-28 PROCEDURE — 3074F SYST BP LT 130 MM HG: CPT | Mod: CPTII,,, | Performed by: INTERNAL MEDICINE

## 2025-01-28 PROCEDURE — 99213 OFFICE O/P EST LOW 20 MIN: CPT | Mod: PBBFAC | Performed by: INTERNAL MEDICINE

## 2025-01-28 PROCEDURE — 3079F DIAST BP 80-89 MM HG: CPT | Mod: CPTII,,, | Performed by: INTERNAL MEDICINE

## 2025-01-28 PROCEDURE — 1159F MED LIST DOCD IN RCRD: CPT | Mod: CPTII,,, | Performed by: INTERNAL MEDICINE

## 2025-01-28 PROCEDURE — 3008F BODY MASS INDEX DOCD: CPT | Mod: CPTII,,, | Performed by: INTERNAL MEDICINE

## 2025-01-28 PROCEDURE — 99214 OFFICE O/P EST MOD 30 MIN: CPT | Mod: S$PBB,,, | Performed by: INTERNAL MEDICINE

## 2025-01-28 PROCEDURE — 99999 PR PBB SHADOW E&M-EST. PATIENT-LVL III: CPT | Mod: PBBFAC,,, | Performed by: INTERNAL MEDICINE

## 2025-01-30 NOTE — PROGRESS NOTES
Progress Report  Nephrology      Consult Requested By: PCP  Reason for Consult: Proteinuria and hematuria    History of Present Illness:  Patient is a 59 y.o. male presents for a post hospital discharge follow up visit for evaluation of newly diagnosed Henoch-Schonlein Purpura and acute IgA nephropathy     The patient reports improvement in skin rash. Urine discoloration still intermittent. Currently on a stable dose of steroids.     The patient denies taking NSAIDs, recreational drugs, recent episode of dehydration, diarrhea, nausea or vomiting, or exposure to IV radiocontrast.     PAST MEDICAL HISTORY  Hyperlipidemia  IgA vasculitis      Current Outpatient Medications:     acetaminophen (TYLENOL EXTRA STRENGTH) 500 MG tablet, Take 500 mg by mouth every 6 (six) hours as needed for Pain., Disp: , Rfl:     acetaminophen (TYLENOL) 500 MG tablet, Take 2 tablets (1,000 mg total) by mouth every 6 (six) hours as needed for Pain., Disp: 60 tablet, Rfl: 1    atorvastatin (LIPITOR) 20 MG tablet, Take 20 mg by mouth every evening., Disp: , Rfl:     hydrOXYzine HCL (ATARAX) 25 MG tablet, Take 1 tablet (25 mg total) by mouth 3 (three) times daily., Disp: 20 tablet, Rfl: 0    losartan (COZAAR) 50 MG tablet, Take 1 tablet (50 mg total) by mouth once daily., Disp: 90 tablet, Rfl: 3    mupirocin (BACTROBAN) 2 % ointment, , Disp: , Rfl:     nortriptyline (PAMELOR) 10 MG capsule, Take 1 capsule (10 mg total) by mouth every evening., Disp: 30 capsule, Rfl: 2    pimecrolimus (ELIDEL) 1 % cream, , Disp: , Rfl:     predniSONE (DELTASONE) 10 MG tablet, Take 2 tablets (20 mg total) by mouth once daily., Disp: 180 tablet, Rfl: 1    sulfamethoxazole-trimethoprim 800-160mg (BACTRIM DS) 800-160 mg Tab, Take 1 tablet by mouth 3 (three) times a week., Disp: 30 tablet, Rfl: 1    triamcinolone acetonide 0.1% (KENALOG) 0.1 % cream, Apply topically 2 (two) times daily., Disp: 80 g, Rfl: 1    empagliflozin (JARDIANCE) 10 mg tablet, Take 1 tablet (10  mg total) by mouth once daily., Disp: 90 tablet, Rfl: 3    gabapentin (NEURONTIN) 300 MG capsule, Take 1 capsule (300 mg total) by mouth 3 (three) times daily as needed (For leg pain/burning, this medication can be discontinued when symptoms improve). (Patient not taking: Reported on 1/28/2025), Disp: 150 capsule, Rfl: 1    white petrolatum 41 % Oint, Apply topically as needed. (Patient not taking: Reported on 12/18/2024), Disp: 198 g, Rfl: 11  Review of patient's allergies indicates:   Allergen Reactions    Keflex [cephalexin] Rash        Past Surgical History:   Procedure Laterality Date    RENAL BIOPSY N/A 10/17/2024    Procedure: IN Biopsy, Kidney;  Surgeon: Rodo Taylor MD;  Location: [facility] CATH LAB;  Service: Interventional Nephrology;  Laterality: N/A;     Family History   Problem Relation Name Age of Onset    Hypertension Mother       Social History     Tobacco Use    Smoking status: Every Day     Current packs/day: 1.00     Types: Cigarettes     Passive exposure: Current    Smokeless tobacco: Never   Substance Use Topics    Alcohol use: No    Drug use: No       Review of Systems   Constitutional: Negative.    Respiratory:  Negative for shortness of breath.    Cardiovascular:  Positive for leg swelling.   Gastrointestinal:  Negative for diarrhea.   Genitourinary:  Positive for hematuria.   Skin:  Positive for rash.   All other systems reviewed and are negative.      Vitals:    01/28/25 1308   BP: 127/81   Pulse: 92   Resp: 18       PHYSICAL EXAMINATION:  General: no distress, well nourished  Skin: color, texture, turgor normal. Healing vesicular purpuric rash in legs and feet.   HEENT: Eyes: reactive pupils, normal conjunctiva. Oral mucosa moist, no ulcers.   Neck: supple, symmetrical, trachea midline, no JVD, no carotid bruit  Lungs: clear to auscultation bilaterally and normal respiratory effort  Cardiovascular: Heart: regular rate and rhythm, S1, S2 normal, no murmur, rub or gallop.  Abdomen: bowel  "sounds present, no abdominal bruit, soft, non-tender   Musculoskeletal: trace pitting edema in lower extremities, no clubbing or cyanosis  Lymph Nodes: No cervical or supraclavicular adenopathy  Neurologic: AAOx3, normal strength and tone. No focal deficit. No asterixis.       LABORATORY DATA:  Lab Results   Component Value Date    CREATININE 0.9 01/06/2025       Prot/Creat Ratio, Urine   Date Value Ref Range Status   01/06/2025 0.58 (H) 0.00 - 0.20 Final   12/18/2024 1.08 (H) 0.00 - 0.20 Final   10/31/2024 3.78 (H) 0.00 - 0.20 Final       Lab Results   Component Value Date     01/06/2025    K 4.0 01/06/2025    CO2 25 01/06/2025       Lab Results   Component Value Date    CALCIUM 8.8 01/06/2025    PHOS 3.0 01/06/2025       Lab Results   Component Value Date    HGB 14.8 01/06/2025        No results found for: "HGBA1C"    Lab Results   Component Value Date    LDLCALC 111.2 12/18/2024         IMAGING STUDIES  Kidney US: Reviewed  Echocardiogram: Reviewed    IMPRESSION / RECOMMENDATIONS:     1. IgA mediated leukocytoclastic vasculitis / Henoch-Schonlein purpura nephritis Began therapy with prednisone in the hospital. The dose has been rapidly tapered down to 10 mg qd in 3 weeks.  Skin lesions have markedly improved. Kidney function remains within normal limits. However, urine sediment microscopy revealed persistence of numerous dysmorphic erythrocytes, WBC casts, and lipiduria, and proteinuria has worsened to nephrotic range. Therefore, I increased prednisone back to 20 mg qd. Now proteinuria has improved, will repeat urine sediment microscopy. Will explore addition of complement inhibitor iptacopan for the treatment of IgA nephropathy. Will continue PJP prophylaxis with Bactrim DS. For antiproteinuric therapy, will keep losartan 50 mg qd. Will continue empagliflozin. May switch losartan to sparsentan on next visit. Stopped gabapentin for rash/edema-related pain (resolved)    predniSONE (DELTASONE) 10 MG tablet    " sulfamethoxazole-trimethoprim 800-160mg (BACTRIM DS) 800-160 mg Tab    empagliflozin (JARDIANCE) 10 mg tablet    losartan (COZAAR) 50 MG tablet    gabapentin (NEURONTIN) 300 MG capsule      2. Persistent proteinuria  As above  predniSONE (DELTASONE) 10 MG tablet    sulfamethoxazole-trimethoprim 800-160mg (BACTRIM DS) 800-160 mg Tab    empagliflozin (JARDIANCE) 10 mg tablet    losartan (COZAAR) 50 MG tablet      3. IgA nephropathy determined by biopsy of kidney  As above      4. Hypertension secondary to other renal disorders  empagliflozin (JARDIANCE) 10 mg tablet    losartan (COZAAR) 50 MG tablet      5. CKD (chronic kidney disease) stage 2, GFR 60-89 ml/min  Due to IgAN            SUMMARY OF PLAN:  Continue prednisone 20 mg qd  Continue losartan 50 mg qd  Continue empagliflozin  Continue TMP-SXZ  Avoid NSAIDs  Will explore transition to iptacopan  7.   Urine sediment microscopy  8.   RTC in 1 mo

## 2025-02-21 ENCOUNTER — TELEPHONE (OUTPATIENT)
Dept: NEPHROLOGY | Facility: CLINIC | Age: 60
End: 2025-02-21
Payer: MEDICAID

## 2025-02-21 DIAGNOSIS — N18.2 CKD (CHRONIC KIDNEY DISEASE) STAGE 2, GFR 60-89 ML/MIN: Primary | ICD-10-CM

## 2025-02-24 ENCOUNTER — LAB VISIT (OUTPATIENT)
Dept: LAB | Facility: HOSPITAL | Age: 60
End: 2025-02-24
Attending: INTERNAL MEDICINE
Payer: MEDICAID

## 2025-02-24 DIAGNOSIS — N18.2 CKD (CHRONIC KIDNEY DISEASE) STAGE 2, GFR 60-89 ML/MIN: ICD-10-CM

## 2025-02-24 PROCEDURE — 80069 RENAL FUNCTION PANEL: CPT | Performed by: INTERNAL MEDICINE

## 2025-02-24 PROCEDURE — 36415 COLL VENOUS BLD VENIPUNCTURE: CPT | Mod: PO | Performed by: INTERNAL MEDICINE

## 2025-02-24 PROCEDURE — 85025 COMPLETE CBC W/AUTO DIFF WBC: CPT | Performed by: INTERNAL MEDICINE

## 2025-02-25 LAB
ALBUMIN SERPL BCP-MCNC: 3.8 G/DL (ref 3.5–5.2)
ANION GAP SERPL CALC-SCNC: 7 MMOL/L (ref 8–16)
BASOPHILS # BLD AUTO: 0.05 K/UL (ref 0–0.2)
BASOPHILS NFR BLD: 0.3 % (ref 0–1.9)
BUN SERPL-MCNC: 20 MG/DL (ref 6–20)
CALCIUM SERPL-MCNC: 9.3 MG/DL (ref 8.7–10.5)
CHLORIDE SERPL-SCNC: 107 MMOL/L (ref 95–110)
CO2 SERPL-SCNC: 26 MMOL/L (ref 23–29)
CREAT SERPL-MCNC: 0.8 MG/DL (ref 0.5–1.4)
DIFFERENTIAL METHOD BLD: ABNORMAL
EOSINOPHIL # BLD AUTO: 0.2 K/UL (ref 0–0.5)
EOSINOPHIL NFR BLD: 1.6 % (ref 0–8)
ERYTHROCYTE [DISTWIDTH] IN BLOOD BY AUTOMATED COUNT: 14.6 % (ref 11.5–14.5)
EST. GFR  (NO RACE VARIABLE): >60 ML/MIN/1.73 M^2
GLUCOSE SERPL-MCNC: 73 MG/DL (ref 70–110)
HCT VFR BLD AUTO: 45.4 % (ref 40–54)
HGB BLD-MCNC: 14.3 G/DL (ref 14–18)
IMM GRANULOCYTES # BLD AUTO: 0.05 K/UL (ref 0–0.04)
IMM GRANULOCYTES NFR BLD AUTO: 0.3 % (ref 0–0.5)
LYMPHOCYTES # BLD AUTO: 2.7 K/UL (ref 1–4.8)
LYMPHOCYTES NFR BLD: 18.5 % (ref 18–48)
MCH RBC QN AUTO: 30.5 PG (ref 27–31)
MCHC RBC AUTO-ENTMCNC: 31.5 G/DL (ref 32–36)
MCV RBC AUTO: 97 FL (ref 82–98)
MONOCYTES # BLD AUTO: 1.2 K/UL (ref 0.3–1)
MONOCYTES NFR BLD: 7.9 % (ref 4–15)
NEUTROPHILS # BLD AUTO: 10.4 K/UL (ref 1.8–7.7)
NEUTROPHILS NFR BLD: 71.4 % (ref 38–73)
NRBC BLD-RTO: 0 /100 WBC
PHOSPHATE SERPL-MCNC: 3.4 MG/DL (ref 2.7–4.5)
PLATELET # BLD AUTO: 439 K/UL (ref 150–450)
PMV BLD AUTO: 10 FL (ref 9.2–12.9)
POTASSIUM SERPL-SCNC: 4 MMOL/L (ref 3.5–5.1)
RBC # BLD AUTO: 4.69 M/UL (ref 4.6–6.2)
SODIUM SERPL-SCNC: 140 MMOL/L (ref 136–145)
WBC # BLD AUTO: 14.52 K/UL (ref 3.9–12.7)

## 2025-02-26 ENCOUNTER — OFFICE VISIT (OUTPATIENT)
Dept: NEPHROLOGY | Facility: CLINIC | Age: 60
End: 2025-02-26
Payer: MEDICAID

## 2025-02-26 VITALS
OXYGEN SATURATION: 98 % | DIASTOLIC BLOOD PRESSURE: 81 MMHG | HEIGHT: 66 IN | WEIGHT: 148.81 LBS | BODY MASS INDEX: 23.92 KG/M2 | SYSTOLIC BLOOD PRESSURE: 118 MMHG

## 2025-02-26 DIAGNOSIS — D69.0 IGA MEDIATED LEUKOCYTOCLASTIC VASCULITIS: ICD-10-CM

## 2025-02-26 DIAGNOSIS — N02.B9 IGA NEPHROPATHY DETERMINED BY BIOPSY OF KIDNEY: ICD-10-CM

## 2025-02-26 DIAGNOSIS — D69.0 HENOCH-SCHONLEIN PURPURA: Primary | ICD-10-CM

## 2025-02-26 DIAGNOSIS — N18.2 CKD (CHRONIC KIDNEY DISEASE) STAGE 2, GFR 60-89 ML/MIN: ICD-10-CM

## 2025-02-26 DIAGNOSIS — I15.1 HYPERTENSION SECONDARY TO OTHER RENAL DISORDERS: ICD-10-CM

## 2025-02-26 DIAGNOSIS — R80.1 PERSISTENT PROTEINURIA: ICD-10-CM

## 2025-02-26 PROCEDURE — 99214 OFFICE O/P EST MOD 30 MIN: CPT | Mod: PBBFAC | Performed by: INTERNAL MEDICINE

## 2025-02-26 PROCEDURE — 99999 PR PBB SHADOW E&M-EST. PATIENT-LVL IV: CPT | Mod: PBBFAC,,, | Performed by: INTERNAL MEDICINE

## 2025-02-26 RX ORDER — DAPSONE 100 MG/1
100 TABLET ORAL DAILY
COMMUNITY

## 2025-02-26 NOTE — PROGRESS NOTES
Progress Report  Nephrology      Consult Requested By: PCP  Reason for Consult: Proteinuria and hematuria    History of Present Illness:  Patient is a 59 y.o. male presents for a post hospital discharge follow up visit for evaluation of newly diagnosed Henoch-Schonlein Purpura and acute IgA nephropathy     The patient reports lack of improvement in skin rash. Urine discoloration still intermittent. Currently on a stable dose of steroids.     The patient denies taking NSAIDs, recreational drugs, recent episode of dehydration, diarrhea, nausea or vomiting, or exposure to IV radiocontrast.     PAST MEDICAL HISTORY  Hyperlipidemia  IgA vasculitis      Current Outpatient Medications:     acetaminophen (TYLENOL EXTRA STRENGTH) 500 MG tablet, Take 500 mg by mouth every 6 (six) hours as needed for Pain., Disp: , Rfl:     acetaminophen (TYLENOL) 500 MG tablet, Take 2 tablets (1,000 mg total) by mouth every 6 (six) hours as needed for Pain., Disp: 60 tablet, Rfl: 1    atorvastatin (LIPITOR) 20 MG tablet, Take 20 mg by mouth every evening., Disp: , Rfl:     dapsone 100 MG Tab, Take 100 mg by mouth once daily., Disp: , Rfl:     empagliflozin (JARDIANCE) 10 mg tablet, Take 1 tablet (10 mg total) by mouth once daily., Disp: 90 tablet, Rfl: 3    losartan (COZAAR) 50 MG tablet, Take 1 tablet (50 mg total) by mouth once daily., Disp: 90 tablet, Rfl: 3    predniSONE (DELTASONE) 10 MG tablet, Take 2 tablets (20 mg total) by mouth once daily., Disp: 180 tablet, Rfl: 1    gabapentin (NEURONTIN) 300 MG capsule, Take 1 capsule (300 mg total) by mouth 3 (three) times daily as needed (For leg pain/burning, this medication can be discontinued when symptoms improve). (Patient not taking: Reported on 12/18/2024), Disp: 150 capsule, Rfl: 1    hydrOXYzine HCL (ATARAX) 25 MG tablet, Take 1 tablet (25 mg total) by mouth 3 (three) times daily. (Patient not taking: Reported on 2/26/2025), Disp: 20 tablet, Rfl: 0    mupirocin (BACTROBAN) 2 %  ointment, , Disp: , Rfl:     nortriptyline (PAMELOR) 10 MG capsule, Take 1 capsule (10 mg total) by mouth every evening. (Patient not taking: Reported on 2/26/2025), Disp: 30 capsule, Rfl: 2    pimecrolimus (ELIDEL) 1 % cream, , Disp: , Rfl:     sulfamethoxazole-trimethoprim 800-160mg (BACTRIM DS) 800-160 mg Tab, Take 1 tablet by mouth 3 (three) times a week. (Patient not taking: Reported on 2/26/2025), Disp: 30 tablet, Rfl: 1    triamcinolone acetonide 0.1% (KENALOG) 0.1 % cream, Apply topically 2 (two) times daily. (Patient not taking: Reported on 2/26/2025), Disp: 80 g, Rfl: 1    white petrolatum 41 % Oint, Apply topically as needed. (Patient not taking: Reported on 12/18/2024), Disp: 198 g, Rfl: 11  Review of patient's allergies indicates:   Allergen Reactions    Keflex [cephalexin] Rash        Past Surgical History:   Procedure Laterality Date    RENAL BIOPSY N/A 10/17/2024    Procedure: IN Biopsy, Kidney;  Surgeon: Rodo Taylor MD;  [place] [facility] [place] [facility];  Service: Interventional Nephrology;  Laterality: N/A;     Family History   Problem Relation Name Age of Onset    Hypertension Mother       Social History     Tobacco Use    Smoking status: Every Day     Current packs/day: 1.00     Types: Cigarettes     Passive exposure: Current    Smokeless tobacco: Never   Substance Use Topics    Alcohol use: No    Drug use: No       Review of Systems   Constitutional: Negative.    Respiratory:  Negative for shortness of breath.    Cardiovascular:  Positive for leg swelling.   Gastrointestinal:  Negative for diarrhea.   Genitourinary:  Positive for hematuria.   Skin:  Positive for rash.   All other systems reviewed and are negative.      Vitals:    02/26/25 1437   BP: 118/81       PHYSICAL EXAMINATION:  General: no distress, well nourished  Skin: color, texture, turgor normal. Healing vesicular purpuric rash in legs and feet.   HEENT: Eyes: reactive pupils, normal conjunctiva. Oral mucosa moist, no ulcers.   Neck:  "supple, symmetrical, trachea midline, no JVD, no carotid bruit  Lungs: clear to auscultation bilaterally and normal respiratory effort  Cardiovascular: Heart: regular rate and rhythm, S1, S2 normal, no murmur, rub or gallop.  Abdomen: bowel sounds present, no abdominal bruit, soft, non-tender   Musculoskeletal: trace pitting edema in lower extremities, no clubbing or cyanosis  Lymph Nodes: No cervical or supraclavicular adenopathy  Neurologic: AAOx3, normal strength and tone. No focal deficit. No asterixis.       LABORATORY DATA:  Lab Results   Component Value Date    CREATININE 0.8 02/24/2025       Prot/Creat Ratio, Urine   Date Value Ref Range Status   02/24/2025 0.54 (H) 0.00 - 0.20 Final   01/06/2025 0.58 (H) 0.00 - 0.20 Final   12/18/2024 1.08 (H) 0.00 - 0.20 Final       Lab Results   Component Value Date     02/24/2025    K 4.0 02/24/2025    CO2 26 02/24/2025       Lab Results   Component Value Date    CALCIUM 9.3 02/24/2025    PHOS 3.4 02/24/2025       Lab Results   Component Value Date    HGB 14.3 02/24/2025        No results found for: "HGBA1C"    Lab Results   Component Value Date    LDLCALC 111.2 12/18/2024         IMAGING STUDIES  Kidney US: Reviewed  Echocardiogram: Reviewed    IMPRESSION / RECOMMENDATIONS:     1. 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 However, urine sediment microscopy revealed persistence of dysmorphic erythrocytes. Proteinuria has improved from nephrotic range to 0.5 g/g and remained stable, ie, almost complete renal remission on prednisone 20 mg qd. \However, his dermatological lesions have not dissipated. Will consult [facility] to reevaluate skin leukocytoclastic vasculitis. Will explore addition of complement inhibitor iptacopan for the treatment of IgA nephropathy depending on clinical course. Will continue PJP prophylaxis with Bactrim DS. For antiproteinuric therapy, will keep " losartan 50 mg qd. Will continue empagliflozin. May switch losartan to sparsentan if proteinuria increases. Stopped gabapentin for rash/edema-related pain (resolved)      2. Persistent proteinuria  As above  predniSONE (DELTASONE) 10 MG tablet    sulfamethoxazole-trimethoprim 800-160mg (BACTRIM DS) 800-160 mg Tab    empagliflozin (JARDIANCE) 10 mg tablet    losartan (COZAAR) 50 MG tablet      3. IgA nephropathy determined by biopsy of kidney  As above      4. Hypertension secondary to other renal disorders  empagliflozin (JARDIANCE) 10 mg tablet    losartan (COZAAR) 50 MG tablet      5. CKD (chronic kidney disease) stage 2, GFR 60-89 ml/min  Due to IgAN            SUMMARY OF PLAN:  Continue prednisone 20 mg qd  Continue losartan 50 mg qd  Continue empagliflozin  Continue TMP-SXZ  Avoid NSAIDs  Will explore transition to iptacopan  7.   Urine sediment microscopy  8.   Dermatology consult  9.   RTC in 6 weeks

## 2025-02-27 ENCOUNTER — TELEPHONE (OUTPATIENT)
Dept: DERMATOLOGY | Facility: CLINIC | Age: 60
End: 2025-02-27
Payer: MEDICAID

## 2025-03-03 DIAGNOSIS — N18.2 CKD (CHRONIC KIDNEY DISEASE) STAGE 2, GFR 60-89 ML/MIN: Primary | ICD-10-CM

## 2025-03-18 ENCOUNTER — TELEPHONE (OUTPATIENT)
Dept: DERMATOLOGY | Facility: CLINIC | Age: 60
End: 2025-03-18
Payer: MEDICAID

## 2025-03-19 ENCOUNTER — OFFICE VISIT (OUTPATIENT)
Dept: DERMATOLOGY | Facility: CLINIC | Age: 60
End: 2025-03-19
Payer: MEDICAID

## 2025-03-19 ENCOUNTER — OFFICE VISIT (OUTPATIENT)
Dept: NEPHROLOGY | Facility: CLINIC | Age: 60
End: 2025-03-19
Payer: MEDICAID

## 2025-03-19 ENCOUNTER — LAB VISIT (OUTPATIENT)
Dept: LAB | Facility: HOSPITAL | Age: 60
End: 2025-03-19
Attending: INTERNAL MEDICINE
Payer: MEDICAID

## 2025-03-19 VITALS
HEIGHT: 66 IN | WEIGHT: 147.69 LBS | RESPIRATION RATE: 18 BRPM | SYSTOLIC BLOOD PRESSURE: 130 MMHG | DIASTOLIC BLOOD PRESSURE: 86 MMHG | HEART RATE: 76 BPM | OXYGEN SATURATION: 92 % | BODY MASS INDEX: 23.74 KG/M2

## 2025-03-19 DIAGNOSIS — N18.2 CKD (CHRONIC KIDNEY DISEASE) STAGE 2, GFR 60-89 ML/MIN: ICD-10-CM

## 2025-03-19 DIAGNOSIS — D69.0: ICD-10-CM

## 2025-03-19 DIAGNOSIS — N18.2 CKD (CHRONIC KIDNEY DISEASE) STAGE 2, GFR 60-89 ML/MIN: Primary | ICD-10-CM

## 2025-03-19 DIAGNOSIS — L30.9 DERMATITIS, UNSPECIFIED: Primary | ICD-10-CM

## 2025-03-19 DIAGNOSIS — D69.0 IGA MEDIATED LEUKOCYTOCLASTIC VASCULITIS: ICD-10-CM

## 2025-03-19 DIAGNOSIS — N08: ICD-10-CM

## 2025-03-19 DIAGNOSIS — R80.1 PERSISTENT PROTEINURIA: ICD-10-CM

## 2025-03-19 DIAGNOSIS — N02.B9 IGA NEPHROPATHY DETERMINED BY BIOPSY OF KIDNEY: Primary | ICD-10-CM

## 2025-03-19 LAB
ALBUMIN SERPL BCP-MCNC: 3.8 G/DL (ref 3.5–5.2)
ANION GAP SERPL CALC-SCNC: 7 MMOL/L (ref 8–16)
BASOPHILS # BLD AUTO: 0.03 K/UL (ref 0–0.2)
BASOPHILS NFR BLD: 0.3 % (ref 0–1.9)
BUN SERPL-MCNC: 27 MG/DL (ref 6–20)
CALCIUM SERPL-MCNC: 9.6 MG/DL (ref 8.7–10.5)
CHLORIDE SERPL-SCNC: 110 MMOL/L (ref 95–110)
CO2 SERPL-SCNC: 25 MMOL/L (ref 23–29)
CREAT SERPL-MCNC: 0.9 MG/DL (ref 0.5–1.4)
DIFFERENTIAL METHOD BLD: ABNORMAL
EOSINOPHIL # BLD AUTO: 0.1 K/UL (ref 0–0.5)
EOSINOPHIL NFR BLD: 0.9 % (ref 0–8)
ERYTHROCYTE [DISTWIDTH] IN BLOOD BY AUTOMATED COUNT: 14.1 % (ref 11.5–14.5)
EST. GFR  (NO RACE VARIABLE): >60 ML/MIN/1.73 M^2
GLUCOSE SERPL-MCNC: 70 MG/DL (ref 70–110)
HCT VFR BLD AUTO: 42.9 % (ref 40–54)
HGB BLD-MCNC: 13.8 G/DL (ref 14–18)
IMM GRANULOCYTES # BLD AUTO: 0.05 K/UL (ref 0–0.04)
IMM GRANULOCYTES NFR BLD AUTO: 0.4 % (ref 0–0.5)
LYMPHOCYTES # BLD AUTO: 1.8 K/UL (ref 1–4.8)
LYMPHOCYTES NFR BLD: 15.2 % (ref 18–48)
MCH RBC QN AUTO: 31.9 PG (ref 27–31)
MCHC RBC AUTO-ENTMCNC: 32.2 G/DL (ref 32–36)
MCV RBC AUTO: 99 FL (ref 82–98)
MONOCYTES # BLD AUTO: 1.1 K/UL (ref 0.3–1)
MONOCYTES NFR BLD: 8.9 % (ref 4–15)
NEUTROPHILS # BLD AUTO: 8.7 K/UL (ref 1.8–7.7)
NEUTROPHILS NFR BLD: 74.3 % (ref 38–73)
NRBC BLD-RTO: 0 /100 WBC
PHOSPHATE SERPL-MCNC: 3.8 MG/DL (ref 2.7–4.5)
PLATELET # BLD AUTO: 442 K/UL (ref 150–450)
PMV BLD AUTO: 9.7 FL (ref 9.2–12.9)
POTASSIUM SERPL-SCNC: 4.6 MMOL/L (ref 3.5–5.1)
RBC # BLD AUTO: 4.33 M/UL (ref 4.6–6.2)
SODIUM SERPL-SCNC: 142 MMOL/L (ref 136–145)
WBC # BLD AUTO: 11.74 K/UL (ref 3.9–12.7)

## 2025-03-19 PROCEDURE — 3075F SYST BP GE 130 - 139MM HG: CPT | Mod: CPTII,,, | Performed by: INTERNAL MEDICINE

## 2025-03-19 PROCEDURE — 3079F DIAST BP 80-89 MM HG: CPT | Mod: CPTII,,, | Performed by: INTERNAL MEDICINE

## 2025-03-19 PROCEDURE — 80069 RENAL FUNCTION PANEL: CPT | Performed by: INTERNAL MEDICINE

## 2025-03-19 PROCEDURE — 99214 OFFICE O/P EST MOD 30 MIN: CPT | Mod: PBBFAC | Performed by: STUDENT IN AN ORGANIZED HEALTH CARE EDUCATION/TRAINING PROGRAM

## 2025-03-19 PROCEDURE — G2211 COMPLEX E/M VISIT ADD ON: HCPCS | Mod: S$PBB,,, | Performed by: INTERNAL MEDICINE

## 2025-03-19 PROCEDURE — 85025 COMPLETE CBC W/AUTO DIFF WBC: CPT | Performed by: INTERNAL MEDICINE

## 2025-03-19 PROCEDURE — 99214 OFFICE O/P EST MOD 30 MIN: CPT | Mod: S$PBB,,, | Performed by: INTERNAL MEDICINE

## 2025-03-19 PROCEDURE — 11104 PUNCH BX SKIN SINGLE LESION: CPT | Mod: PBBFAC | Performed by: STUDENT IN AN ORGANIZED HEALTH CARE EDUCATION/TRAINING PROGRAM

## 2025-03-19 PROCEDURE — 99999 PR PBB SHADOW E&M-EST. PATIENT-LVL IV: CPT | Mod: PBBFAC,,, | Performed by: INTERNAL MEDICINE

## 2025-03-19 PROCEDURE — 3066F NEPHROPATHY DOC TX: CPT | Mod: CPTII,,, | Performed by: INTERNAL MEDICINE

## 2025-03-19 PROCEDURE — 99214 OFFICE O/P EST MOD 30 MIN: CPT | Mod: PBBFAC,25,27 | Performed by: INTERNAL MEDICINE

## 2025-03-19 PROCEDURE — 99999 PR PBB SHADOW E&M-EST. PATIENT-LVL IV: CPT | Mod: PBBFAC,,, | Performed by: STUDENT IN AN ORGANIZED HEALTH CARE EDUCATION/TRAINING PROGRAM

## 2025-03-19 PROCEDURE — 4010F ACE/ARB THERAPY RXD/TAKEN: CPT | Mod: CPTII,,, | Performed by: INTERNAL MEDICINE

## 2025-03-19 PROCEDURE — 3008F BODY MASS INDEX DOCD: CPT | Mod: CPTII,,, | Performed by: INTERNAL MEDICINE

## 2025-03-19 PROCEDURE — 36415 COLL VENOUS BLD VENIPUNCTURE: CPT | Performed by: INTERNAL MEDICINE

## 2025-03-19 RX ORDER — TERBINAFINE HYDROCHLORIDE 250 MG/1
TABLET ORAL
Qty: 28 TABLET | Refills: 0 | Status: SHIPPED | OUTPATIENT
Start: 2025-03-19

## 2025-03-19 RX ORDER — TRIAMCINOLONE ACETONIDE 1 MG/G
CREAM TOPICAL
Qty: 454 G | Refills: 2 | Status: SHIPPED | OUTPATIENT
Start: 2025-03-19

## 2025-03-19 RX ORDER — PREDNISONE 10 MG/1
15 TABLET ORAL DAILY
Qty: 180 TABLET | Refills: 1 | Status: SHIPPED | OUTPATIENT
Start: 2025-03-19

## 2025-03-19 RX ORDER — HYDROXYZINE HYDROCHLORIDE 25 MG/1
TABLET, FILM COATED ORAL
Qty: 30 TABLET | Refills: 2 | Status: SHIPPED | OUTPATIENT
Start: 2025-03-19

## 2025-03-19 NOTE — PROGRESS NOTES
Progress Report  Nephrology      Consult Requested By: PCP  Reason for Consult: Proteinuria and hematuria    History of Present Illness:  Patient is a 59 y.o. male presents for a post hospital discharge follow up visit for evaluation of newly diagnosed Henoch-Schonlein Purpura and acute IgA nephropathy     The patient reports lack of improvement in skin rash. Urine discoloration still intermittent. Currently on a stable dose of steroids.     The patient denies taking NSAIDs, recreational drugs, recent episode of dehydration, diarrhea, nausea or vomiting, or exposure to IV radiocontrast.     PAST MEDICAL HISTORY  Hyperlipidemia  IgA vasculitis      Current Outpatient Medications:     acetaminophen (TYLENOL EXTRA STRENGTH) 500 MG tablet, Take 500 mg by mouth every 6 (six) hours as needed for Pain., Disp: , Rfl:     acetaminophen (TYLENOL) 500 MG tablet, Take 2 tablets (1,000 mg total) by mouth every 6 (six) hours as needed for Pain., Disp: 60 tablet, Rfl: 1    atorvastatin (LIPITOR) 20 MG tablet, Take 20 mg by mouth every evening., Disp: , Rfl:     dapsone 100 MG Tab, Take 100 mg by mouth once daily., Disp: , Rfl:     gabapentin (NEURONTIN) 300 MG capsule, Take 1 capsule (300 mg total) by mouth 3 (three) times daily as needed (For leg pain/burning, this medication can be discontinued when symptoms improve)., Disp: 150 capsule, Rfl: 1    hydrOXYzine HCL (ATARAX) 25 MG tablet, Take 1 tablet (25 mg total) by mouth 3 (three) times daily., Disp: 20 tablet, Rfl: 0    hydrOXYzine HCL (ATARAX) 25 MG tablet, Take 1 tablet at night as needed for itching, Disp: 30 tablet, Rfl: 2    losartan (COZAAR) 50 MG tablet, Take 1 tablet (50 mg total) by mouth once daily., Disp: 90 tablet, Rfl: 3    mupirocin (BACTROBAN) 2 % ointment, , Disp: , Rfl:     nortriptyline (PAMELOR) 10 MG capsule, Take 1 capsule (10 mg total) by mouth every evening., Disp: 30 capsule, Rfl: 2    pimecrolimus (ELIDEL) 1 % cream, , Disp: , Rfl:      sulfamethoxazole-trimethoprim 800-160mg (BACTRIM DS) 800-160 mg Tab, Take 1 tablet by mouth 3 (three) times a week., Disp: 30 tablet, Rfl: 1    terbinafine HCL (LAMISIL) 250 mg tablet, Take 1 pill daily for 2-4 weeks, Disp: 28 tablet, Rfl: 0    triamcinolone acetonide 0.1% (KENALOG) 0.1 % cream, Apply topically 2 (two) times daily., Disp: 80 g, Rfl: 1    triamcinolone acetonide 0.1% (KENALOG) 0.1 % cream, Apply twice daily to affected area of skin, Disp: 454 g, Rfl: 2    predniSONE (DELTASONE) 10 MG tablet, Take 1.5 tablets (15 mg total) by mouth once daily., Disp: 180 tablet, Rfl: 1  Review of patient's allergies indicates:   Allergen Reactions    Keflex [cephalexin] Rash        Past Surgical History:   Procedure Laterality Date    RENAL BIOPSY N/A 10/17/2024    Procedure: IN Biopsy, Kidney;  Surgeon: Rodo Taylor MD;  Location: [facility] CATH LAB;  Service: Interventional Nephrology;  Laterality: N/A;     Family History   Problem Relation Name Age of Onset    Hypertension Mother       Social History     Tobacco Use    Smoking status: Every Day     Current packs/day: 1.00     Types: Cigarettes     Passive exposure: Current    Smokeless tobacco: Never   Substance Use Topics    Alcohol use: No    Drug use: No       Review of Systems   Constitutional: Negative.    Respiratory:  Negative for shortness of breath.    Cardiovascular:  Positive for leg swelling.   Gastrointestinal:  Negative for diarrhea.   Genitourinary:  Positive for hematuria.   Skin:  Positive for rash.   All other systems reviewed and are negative.      Vitals:    03/19/25 1438   BP: 130/86   Pulse: 76   Resp: 18       PHYSICAL EXAMINATION:  General: no distress, well nourished  Skin: color, texture, turgor normal. Healing vesicular purpuric rash in legs and feet.   HEENT: Eyes: reactive pupils, normal conjunctiva. Oral mucosa moist, no ulcers.   Neck: supple, symmetrical, trachea midline, no JVD, no carotid bruit  Lungs: clear to auscultation  "bilaterally and normal respiratory effort  Cardiovascular: Heart: regular rate and rhythm, S1, S2 normal, no murmur, rub or gallop.  Abdomen: bowel sounds present, no abdominal bruit, soft, non-tender   Musculoskeletal: trace pitting edema in lower extremities, no clubbing or cyanosis  Lymph Nodes: No cervical or supraclavicular adenopathy  Neurologic: AAOx3, normal strength and tone. No focal deficit. No asterixis.       LABORATORY DATA:  Lab Results   Component Value Date    CREATININE 0.8 02/24/2025       Prot/Creat Ratio, Urine   Date Value Ref Range Status   02/24/2025 0.54 (H) 0.00 - 0.20 Final   01/06/2025 0.58 (H) 0.00 - 0.20 Final   12/18/2024 1.08 (H) 0.00 - 0.20 Final       Lab Results   Component Value Date     02/24/2025    K 4.0 02/24/2025    CO2 26 02/24/2025       Lab Results   Component Value Date    CALCIUM 9.3 02/24/2025    PHOS 3.4 02/24/2025       Lab Results   Component Value Date    HGB 14.3 02/24/2025        No results found for: "HGBA1C"    Lab Results   Component Value Date    LDLCALC 111.2 12/18/2024         IMAGING STUDIES  Kidney US: Reviewed  Echocardiogram: Reviewed    IMPRESSION / RECOMMENDATIONS:     1. 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 Proteinuria has improved from nephrotic range to 0.5 g/g and remained stable, ie, almost complete renal remission on prednisone 20 mg qd. His dermatological lesions deemed eczematous by Dermatology and unrelated to HSP.  Thus, will begin tapering down steroids. Will explore addition of complement inhibitor iptacopan for the treatment of IgA nephropathy depending on clinical course. Will continue PJP prophylaxis with Bactrim DS. For antiproteinuric therapy, will keep losartan 50 mg qd. Will continue empagliflozin. May switch losartan to sparsentan if proteinuria increases. Stopped gabapentin for rash/edema-related pain (resolved)      2. Persistent " proteinuria  As above  predniSONE (DELTASONE) 10 MG tablet    sulfamethoxazole-trimethoprim 800-160mg (BACTRIM DS) 800-160 mg Tab    empagliflozin (JARDIANCE) 10 mg tablet    losartan (COZAAR) 50 MG tablet      3. IgA nephropathy determined by biopsy of kidney  As above      4. Hypertension secondary to other renal disorders  empagliflozin (JARDIANCE) 10 mg tablet    losartan (COZAAR) 50 MG tablet      5. CKD (chronic kidney disease) stage 2, GFR 60-89 ml/min  Due to IgAN            SUMMARY OF PLAN:  Decrease prednisone to 15 mg qd  Continue losartan 50 mg qd  Continue empagliflozin  Continue TMP-SXZ  Avoid NSAIDs  Will explore transition to iptacopan if needed, not now  7.   Urine sediment microscopy  8.   RTC in 6 weeks    Visit today included increased complexity associated with the care of the episodic problem IgA nephropathy, CKD stage 2, Henoch-Schonlein purpura addressed and managing the longitudinal care of the patient due to the serious and/or complex managed problem(s) IgA nephropathy, CKD stage 2, Henoch-Schonlein purpura

## 2025-03-19 NOTE — PROGRESS NOTES
Subjective:      Patient ID:  Devyn Hazel is a 59 y.o. male who presents for   Chief Complaint   Patient presents with    Rash     Diffuse      HPI    New patient    59 year old M hx HTN (on losartan),  HLD (on lipitor), IgA vasculitis and nepropathy presents today for a second opinion on a rash. He has been seen by [facility] dermatology for a years, but says his nephrologist recommend he come see me. His last office visit with Dr. Cooper (nephrology) was today for f/u IgA vasculititis and nephropathy, is on prednisone 20 mg QD.    His most recent visit with [facility] Dr. Valencia on 1-. At this time dx with IgA vasculitis as well as eczema, increased to dapsone 100 mg QD and TAC 0.1% ointment prescribed. He continues on these medications, says he has 1 week of dapsone left, and has noticed no change or improvement.    He reports 2 years ago, he developed an itchy rash that waxes and wanes in intensity and has 10/10 severity daily itch. He denies a history of childhood atopic dermatitis and reports this was a new occurence. His prior treatments include dupixent (11 injections total per patient) and Rinvoq (he is unsure how long he was on this medication).   In interim, he was hospitalized for IgA vasculitis (see biopsy below) and this rash has resolved with dark marks on the legs. He was treated with prednisone 3 week taper and triamcinolone cream while admitted.     renal biopsy: IgA nephropathy with focal crescent formation   bx at [facility] while admitted inpatient:  Left medial thigh         1. Skin, left medial thigh, punch biopsy:  - CHANGES MOST COMPATIBLE WITH A DRUG ERUPTION WITH ASSOCIATED DRUG-INDUCED LEUKOCYTOCLASTIC VASCULITIS.        DIF with IgA and C3 granular deposition in walls of superficial dermal vessels         Review of Systems   Skin:  Positive for itching and rash.       Objective:   Physical Exam   Constitutional: He appears well-developed and well-nourished. No  distress.   Neurological: He is alert and oriented to person, place, and time. He is not disoriented.   Psychiatric: He has a normal mood and affect.   Skin:               Diagram Legend     Erythematous scaling macule/papule c/w actinic keratosis       Vascular papule c/w angioma      Pigmented verrucoid papule/plaque c/w seborrheic keratosis      Yellow umbilicated papule c/w sebaceous hyperplasia      Irregularly shaped tan macule c/w lentigo     1-2 mm smooth white papules consistent with Milia      Movable subcutaneous cyst with punctum c/w epidermal inclusion cyst      Subcutaneous movable cyst c/w pilar cyst      Firm pink to brown papule c/w dermatofibroma      Pedunculated fleshy papule(s) c/w skin tag(s)      Evenly pigmented macule c/w junctional nevus     Mildly variegated pigmented, slightly irregular-bordered macule c/w mildly atypical nevus      Flesh colored to evenly pigmented papule c/w intradermal nevus       Pink pearly papule/plaque c/w basal cell carcinoma      Erythematous hyperkeratotic cursted plaque c/w SCC      Surgical scar with no sign of skin cancer recurrence      Open and closed comedones      Inflammatory papules and pustules      Verrucoid papule consistent consistent with wart     Erythematous eczematous patches and plaques     Dystrophic onycholytic nail with subungual debris c/w onychomycosis     Umbilicated papule    Erythematous-base heme-crusted tan verrucoid plaque consistent with inflamed seborrheic keratosis     Erythematous Silvery Scaling Plaque c/w Psoriasis     See annotation                                                  Bx site under bandage    Assessment / Plan:      Pathology Orders:       Normal Orders This Visit    Specimen to Pathology, Dermatology     Comments:    Attn: Davida Urbano  Number of Specimens:->1  ------------------------->-------------------------  Spec 1 Procedure:->Biopsy  Spec 1 Clinical Impression:->2 yr hx 10/10 itchy rash.  erythematous  scaly patches and thin lichenified plaques  diffusely, including bathing suit distribution- ddx  eczematous dermatitis (ACD v ICD), CTCL, drug-induced  eczematous eruption, other)  Spec 1 Source:->L flank  Release to patient->Immediate    Questions:    Procedure Type: Dermatology and skin neoplasms    Number of Specimens: 1    ------------------------: -------------------------    Spec 1 Procedure: Biopsy    Spec 1 Clinical Impression: 2 yr hx 10/10 itchy rash. erythematous scaly patches and thin lichenified plaques diffusely, including bathing suit distribution- ddx eczematous dermatitis (ACD v ICD), CTCL, drug-induced eczematous eruption, other)    Spec 1 Source: L flank    Release to patient: Immediate    Release to patient:           Dermatitis, unspecified  2 years of waxing/waning pruritic eruption diffusely, today located over buttocks/lower back, upper back, extensor arms with daily 10/10 pruritus  I recommend biopsy- he has not had biopsy of this rash before only of vasculitis  We discussed differential would include eczematous dermatitis (ACD V ICD), CTCL, drug induced eczematous eruption, id reaction, or other and biopsy may be helpful to guide management    Plan:  Start treatment of tinea pedis as below   Continue triamcinolone cream BID- he prefers cream to ointment- discussed option for wet wrap application  Start hydroxyzine 25 mg qhs prn pruritus   Biopsy results for further discussion of plan. I will discuss case with Dr. Valencia  Discussed can consider [facility]bUVB- [facility]     Punch biopsy procedure note:  Punch biopsy performed after verbal consent obtained. Area marked and prepped with alcohol. Approximately 1cc of 1% lidocaine with epinephrine injected. 6 mm disposable punch used to remove lesion. Hemostasis obtained and biopsy site closed with 2 Prolene sutures. Wound care instructions reviewed with patient and handout given.    Tinea pedis  He has BL  fine flaking, mod to severe, most c/w tinea pedis an may have tinea manuum of 1 hand as well  Start terbinafine 250 mg QD x 2 weeks. Reviewed recent CBC and CMP  Start OTC antifungal cream BID to AA for 2-3 weeks and purchase OTC antifungal sprays/powders to minimize recurrence  Recommend to treat as it is itchy to patient and may sometimes drive auto-eczematization reaction    IgA mediated leukocytoclastic vasculitis  In fall 2024 patient had vasculitic eruption with skin bx c/w IgA vasculitis and renal bx as well. This appears healed- now with post-inflammatory hyperpigmentation of legs  Reviewed photographs in chart c/w vasculitis and prior notes  Continue dapsone 100 mg QD [facility] and I recommend he continue to follow with [facility] who provide excellent care  Continue to follow with nephrology for renal involvement- currently on pred 20    Suture removal on 3- with Dr. Valencia or return to clinic 10-14 d    60 min spent on encounter, including 35 min face to face time with patient. Additional time spent reviewing medical record, documenting in medical record, coordinating care with other providers

## 2025-04-08 ENCOUNTER — RESULTS FOLLOW-UP (OUTPATIENT)
Dept: DERMATOLOGY | Facility: CLINIC | Age: 60
End: 2025-04-08

## 2025-04-10 ENCOUNTER — CLINICAL SUPPORT (OUTPATIENT)
Dept: DERMATOLOGY | Facility: CLINIC | Age: 60
End: 2025-04-10
Payer: MEDICAID

## 2025-04-10 DIAGNOSIS — Z48.02 VISIT FOR SUTURE REMOVAL: Primary | ICD-10-CM

## 2025-04-10 PROCEDURE — 99024 POSTOP FOLLOW-UP VISIT: CPT | Mod: ,,, | Performed by: STUDENT IN AN ORGANIZED HEALTH CARE EDUCATION/TRAINING PROGRAM

## 2025-04-10 NOTE — PROGRESS NOTES
Subjective:      Patient ID:  Devyn Hazel is a 60 y.o. male who presents for No chief complaint on file.    Suture Removal note:  CC: 60 y.o. male patient is here for suture removal.         HPI: Patient is s/p excision of -SPONGIOTIC DERMATITIS WITH EOSINOPHILS, see comment from the left flank  on 3/19/2025.  Patient reports no problems.    WOUND PE:  Sutures intact.  Wound healing well.  Good approximation of skin edges.  No signs or symptoms of infection.    IMPRESSION:  The specimen is received in formalin labeled &quot;L flank&quot;.  The specimen consists of 1 punch of tan-brown skin measuring 0.6 x 0.6 cm and excised to depth of 0.6 cm.  The skin is grossly unremarkable.  The specimen is inked blue at the biopsy   margin, bisected, submitted entirely cassette    - margins clear.    PLAN:  Sutures removed today.  Continue wound care.    RTC: In PRN months.       Review of Systems    Objective:   Physical Exam     Diagram Legend     Erythematous scaling macule/papule c/w actinic keratosis       Vascular papule c/w angioma      Pigmented verrucoid papule/plaque c/w seborrheic keratosis      Yellow umbilicated papule c/w sebaceous hyperplasia      Irregularly shaped tan macule c/w lentigo     1-2 mm smooth white papules consistent with Milia      Movable subcutaneous cyst with punctum c/w epidermal inclusion cyst      Subcutaneous movable cyst c/w pilar cyst      Firm pink to brown papule c/w dermatofibroma      Pedunculated fleshy papule(s) c/w skin tag(s)      Evenly pigmented macule c/w junctional nevus     Mildly variegated pigmented, slightly irregular-bordered macule c/w mildly atypical nevus      Flesh colored to evenly pigmented papule c/w intradermal nevus       Pink pearly papule/plaque c/w basal cell carcinoma      Erythematous hyperkeratotic cursted plaque c/w SCC      Surgical scar with no sign of skin cancer recurrence      Open and closed comedones      Inflammatory papules and pustules       Verrucoid papule consistent consistent with wart     Erythematous eczematous patches and plaques     Dystrophic onycholytic nail with subungual debris c/w onychomycosis     Umbilicated papule    Erythematous-base heme-crusted tan verrucoid plaque consistent with inflamed seborrheic keratosis     Erythematous Silvery Scaling Plaque c/w Psoriasis     See annotation      Assessment / Plan:        There are no diagnoses linked to this encounter.         No follow-ups on file.

## 2025-04-29 ENCOUNTER — APPOINTMENT (OUTPATIENT)
Dept: LAB | Facility: HOSPITAL | Age: 60
End: 2025-04-29
Attending: INTERNAL MEDICINE
Payer: MEDICAID

## 2025-04-30 ENCOUNTER — PATIENT MESSAGE (OUTPATIENT)
Dept: RHEUMATOLOGY | Facility: CLINIC | Age: 60
End: 2025-04-30
Payer: MEDICAID

## 2025-04-30 ENCOUNTER — TELEPHONE (OUTPATIENT)
Dept: NEPHROLOGY | Facility: HOSPITAL | Age: 60
End: 2025-04-30
Payer: MEDICAID

## 2025-04-30 ENCOUNTER — OFFICE VISIT (OUTPATIENT)
Dept: NEPHROLOGY | Facility: CLINIC | Age: 60
End: 2025-04-30
Payer: MEDICAID

## 2025-04-30 VITALS
SYSTOLIC BLOOD PRESSURE: 151 MMHG | OXYGEN SATURATION: 98 % | HEART RATE: 83 BPM | DIASTOLIC BLOOD PRESSURE: 84 MMHG | BODY MASS INDEX: 22.39 KG/M2 | HEIGHT: 66 IN | WEIGHT: 139.31 LBS | RESPIRATION RATE: 18 BRPM

## 2025-04-30 DIAGNOSIS — N18.2 CKD (CHRONIC KIDNEY DISEASE) STAGE 2, GFR 60-89 ML/MIN: Primary | ICD-10-CM

## 2025-04-30 DIAGNOSIS — R80.1 PERSISTENT PROTEINURIA: ICD-10-CM

## 2025-04-30 DIAGNOSIS — I15.1 HYPERTENSION SECONDARY TO OTHER RENAL DISORDERS: ICD-10-CM

## 2025-04-30 DIAGNOSIS — N02.B9 IGA NEPHROPATHY DETERMINED BY BIOPSY OF KIDNEY: Primary | ICD-10-CM

## 2025-04-30 DIAGNOSIS — D69.0 IGA MEDIATED LEUKOCYTOCLASTIC VASCULITIS: ICD-10-CM

## 2025-04-30 PROCEDURE — 99999 PR PBB SHADOW E&M-EST. PATIENT-LVL III: CPT | Mod: PBBFAC,,, | Performed by: INTERNAL MEDICINE

## 2025-04-30 PROCEDURE — 99213 OFFICE O/P EST LOW 20 MIN: CPT | Mod: PBBFAC | Performed by: INTERNAL MEDICINE

## 2025-04-30 RX ORDER — PREDNISONE 10 MG/1
10 TABLET ORAL DAILY
Qty: 90 TABLET | Refills: 1 | Status: SHIPPED | OUTPATIENT
Start: 2025-04-30

## 2025-04-30 NOTE — TELEPHONE ENCOUNTER
Nephrology:    Lab informed me of critical lab at 1:20am. Glucose 48  Tried to call the patient but no response.   Voice mail left.    Inocencio Delcid MD.  Clinical Nephrology Fellow, PGY-4  [facility], [address]

## 2025-04-30 NOTE — PROGRESS NOTES
Progress Report  Nephrology      Consult Requested By: PCP  Reason for Consult: Proteinuria and hematuria    History of Present Illness:  Patient is a 60 y.o. male presents for a post hospital discharge follow up visit for evaluation of newly diagnosed Henoch-Schonlein Purpura and acute IgA nephropathy     The patient reports lack of improvement in skin rash, scheduled to go back to Dermatology clinic next month. Urine discoloration still intermittent. Currently on a stable dose of steroids.     The patient denies taking NSAIDs, recreational drugs, recent episode of dehydration, diarrhea, nausea or vomiting, or exposure to IV radiocontrast.     PAST MEDICAL HISTORY  Hyperlipidemia  IgA vasculitis  HTN      Current Outpatient Medications:     acetaminophen (TYLENOL EXTRA STRENGTH) 500 MG tablet, Take 500 mg by mouth every 6 (six) hours as needed for Pain., Disp: , Rfl:     acetaminophen (TYLENOL) 500 MG tablet, Take 2 tablets (1,000 mg total) by mouth every 6 (six) hours as needed for Pain., Disp: 60 tablet, Rfl: 1    atorvastatin (LIPITOR) 20 MG tablet, Take 20 mg by mouth every evening., Disp: , Rfl:     dapsone 100 MG Tab, Take 100 mg by mouth once daily., Disp: , Rfl:     gabapentin (NEURONTIN) 300 MG capsule, Take 1 capsule (300 mg total) by mouth 3 (three) times daily as needed (For leg pain/burning, this medication can be discontinued when symptoms improve)., Disp: 150 capsule, Rfl: 1    hydrOXYzine HCL (ATARAX) 25 MG tablet, Take 1 tablet (25 mg total) by mouth 3 (three) times daily., Disp: 20 tablet, Rfl: 0    hydrOXYzine HCL (ATARAX) 25 MG tablet, Take 1 tablet at night as needed for itching, Disp: 30 tablet, Rfl: 2    losartan (COZAAR) 50 MG tablet, Take 1 tablet (50 mg total) by mouth once daily., Disp: 90 tablet, Rfl: 3    mupirocin (BACTROBAN) 2 % ointment, , Disp: , Rfl:     nortriptyline (PAMELOR) 10 MG capsule, Take 1 capsule (10 mg total) by mouth every evening., Disp: 30 capsule, Rfl: 2     pimecrolimus (ELIDEL) 1 % cream, , Disp: , Rfl:     sulfamethoxazole-trimethoprim 800-160mg (BACTRIM DS) 800-160 mg Tab, Take 1 tablet by mouth 3 (three) times a week., Disp: 30 tablet, Rfl: 1    terbinafine HCL (LAMISIL) 250 mg tablet, Take 1 pill daily for 2-4 weeks, Disp: 28 tablet, Rfl: 0    triamcinolone acetonide 0.1% (KENALOG) 0.1 % cream, Apply topically 2 (two) times daily., Disp: 80 g, Rfl: 1    triamcinolone acetonide 0.1% (KENALOG) 0.1 % cream, Apply twice daily to affected area of skin, Disp: 454 g, Rfl: 2    predniSONE (DELTASONE) 10 MG tablet, Take 1 tablet (10 mg total) by mouth once daily., Disp: 90 tablet, Rfl: 1  Review of patient's allergies indicates:   Allergen Reactions    Keflex [cephalexin] Rash        Past Surgical History:   Procedure Laterality Date    RENAL BIOPSY N/A 10/17/2024    Procedure: IN Biopsy, Kidney;  Surgeon: Rodo Taylor MD;  Location: [facility] CATH LAB;  Service: Interventional Nephrology;  Laterality: N/A;     Family History   Problem Relation Name Age of Onset    Hypertension Mother       Social History     Tobacco Use    Smoking status: Every Day     Current packs/day: 1.00     Types: Cigarettes     Passive exposure: Current    Smokeless tobacco: Never   Substance Use Topics    Alcohol use: No    Drug use: No       Review of Systems   Constitutional: Negative.    Respiratory:  Negative for shortness of breath.    Cardiovascular:  Positive for leg swelling.   Gastrointestinal:  Negative for diarrhea.   Genitourinary:  Positive for hematuria.   Skin:  Positive for rash.   All other systems reviewed and are negative.      Vitals:    04/30/25 1140   BP: (!) 151/84   Pulse: 83   Resp: 18         PHYSICAL EXAMINATION:  General: no distress, well nourished  Skin: color, texture, turgor normal. Healed vesicular purpuric rash in legs and feet.   HEENT: Eyes: reactive pupils, normal conjunctiva. Oral mucosa moist, no ulcers.   Neck: supple, symmetrical, trachea midline, no JVD, no  "carotid bruit  Lungs: clear to auscultation bilaterally and normal respiratory effort  Cardiovascular: Heart: regular rate and rhythm, S1, S2 normal, no murmur, rub or gallop.  Abdomen: bowel sounds present, no abdominal bruit, soft, non-tender   Musculoskeletal: no pitting edema in lower extremities, no clubbing or cyanosis  Lymph Nodes: No cervical or supraclavicular adenopathy  Neurologic: AAOx3, normal strength and tone. No focal deficit. No asterixis.       LABORATORY DATA:  Lab Results   Component Value Date    CREATININE 0.9 04/29/2025       Urine Protein/Creatinine Ratio   Date Value Ref Range Status   04/29/2025 0.32 (H) <=0.20 Final     Prot/Creat Ratio, Urine   Date Value Ref Range Status   03/19/2025 0.32 (H) 0.00 - 0.20 Final   02/24/2025 0.54 (H) 0.00 - 0.20 Final   01/06/2025 0.58 (H) 0.00 - 0.20 Final       Lab Results   Component Value Date     04/29/2025    K 4.5 04/29/2025    CO2 25 04/29/2025       Lab Results   Component Value Date    CALCIUM 9.9 04/29/2025    PHOS 3.1 04/29/2025       Lab Results   Component Value Date    HGB 14.8 04/29/2025        No results found for: "HGBA1C"    Lab Results   Component Value Date    LDLCALC 111.2 12/18/2024         IMAGING STUDIES  Kidney US: Reviewed  Echocardiogram: Reviewed    IMPRESSION / RECOMMENDATIONS:     1. 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 Proteinuria has improved from nephrotic range to 0.5 g/g and remained stable, ie, almost complete renal remission on prednisone 20 mg qd. His dermatological lesions deemed eczematous by Dermatology and unrelated to HSP.  Thus, will continue tapering down steroids. Will explore addition of complement inhibitor iptacopan for the treatment of IgA nephropathy depending on clinical course. Will continue PJP prophylaxis with Bactrim DS. For antiproteinuric therapy, will keep losartan 50 mg qd. Will continue empagliflozin. " May switch losartan to sparsentan if proteinuria increases. Stopped gabapentin for rash/edema-related pain (resolved)      2. Persistent proteinuria  As above  predniSONE (DELTASONE) 10 MG tablet    sulfamethoxazole-trimethoprim 800-160mg (BACTRIM DS) 800-160 mg Tab    empagliflozin (JARDIANCE) 10 mg tablet    losartan (COZAAR) 50 MG tablet      3. IgA nephropathy determined by biopsy of kidney  As above      4. Hypertension secondary to other renal disorders  empagliflozin (JARDIANCE) 10 mg tablet    losartan (COZAAR) 50 MG tablet      5. CKD (chronic kidney disease) stage 2, GFR 60-89 ml/min  Due to IgAN            SUMMARY OF PLAN:  Decrease prednisone to 10 mg qd  Continue losartan 50 mg qd  Continue empagliflozin  Continue dapsone  Avoid NSAIDs  Will explore transition to iptacopan if needed, not now  7.   Follow up with Dermatology  8.   RTC in 3 mo    Visit today included increased complexity associated with the care of the episodic problem IgA nephropathy, CKD stage 2, Henoch-Schonlein purpura addressed and managing the longitudinal care of the patient due to the serious and/or complex managed problem(s) IgA nephropathy, CKD stage 2, Henoch-Schonlein purpura

## 2025-05-05 ENCOUNTER — PATIENT MESSAGE (OUTPATIENT)
Dept: ADMINISTRATIVE | Facility: HOSPITAL | Age: 60
End: 2025-05-05
Payer: MEDICAID

## 2025-06-02 ENCOUNTER — PATIENT MESSAGE (OUTPATIENT)
Dept: ADMINISTRATIVE | Facility: HOSPITAL | Age: 60
End: 2025-06-02
Payer: MEDICAID

## 2025-07-07 ENCOUNTER — PATIENT MESSAGE (OUTPATIENT)
Dept: ADMINISTRATIVE | Facility: HOSPITAL | Age: 60
End: 2025-07-07
Payer: MEDICAID

## 2025-08-01 ENCOUNTER — LAB VISIT (OUTPATIENT)
Dept: LAB | Facility: HOSPITAL | Age: 60
End: 2025-08-01
Attending: INTERNAL MEDICINE
Payer: MEDICAID

## 2025-08-01 DIAGNOSIS — N18.2 CKD (CHRONIC KIDNEY DISEASE) STAGE 2, GFR 60-89 ML/MIN: ICD-10-CM

## 2025-08-01 LAB
ABSOLUTE EOSINOPHIL (OHS): 0.17 K/UL
ABSOLUTE MONOCYTE (OHS): 0.75 K/UL (ref 0.3–1)
ABSOLUTE NEUTROPHIL COUNT (OHS): 10.61 K/UL (ref 1.8–7.7)
ALBUMIN SERPL BCP-MCNC: 3.7 G/DL (ref 3.5–5.2)
ANION GAP (OHS): 8 MMOL/L (ref 8–16)
BASOPHILS # BLD AUTO: 0.03 K/UL
BASOPHILS NFR BLD AUTO: 0.2 %
BUN SERPL-MCNC: 18 MG/DL (ref 6–20)
CALCIUM SERPL-MCNC: 9.4 MG/DL (ref 8.7–10.5)
CHLORIDE SERPL-SCNC: 107 MMOL/L (ref 95–110)
CO2 SERPL-SCNC: 23 MMOL/L (ref 23–29)
CREAT SERPL-MCNC: 0.9 MG/DL (ref 0.5–1.4)
CREAT UR-MCNC: 82 MG/DL (ref 23–375)
ERYTHROCYTE [DISTWIDTH] IN BLOOD BY AUTOMATED COUNT: 14.7 % (ref 11.5–14.5)
GFR SERPLBLD CREATININE-BSD FMLA CKD-EPI: >60 ML/MIN/1.73/M2
GLUCOSE SERPL-MCNC: 68 MG/DL (ref 70–110)
HCT VFR BLD AUTO: 46 % (ref 40–54)
HGB BLD-MCNC: 14.9 GM/DL (ref 14–18)
IMM GRANULOCYTES # BLD AUTO: 0.06 K/UL (ref 0–0.04)
IMM GRANULOCYTES NFR BLD AUTO: 0.5 % (ref 0–0.5)
LYMPHOCYTES # BLD AUTO: 1.56 K/UL (ref 1–4.8)
MCH RBC QN AUTO: 30.4 PG (ref 27–31)
MCHC RBC AUTO-ENTMCNC: 32.4 G/DL (ref 32–36)
MCV RBC AUTO: 94 FL (ref 82–98)
NUCLEATED RBC (/100WBC) (OHS): 0 /100 WBC
PHOSPHATE SERPL-MCNC: 2.8 MG/DL (ref 2.7–4.5)
PLATELET # BLD AUTO: 431 K/UL (ref 150–450)
PMV BLD AUTO: 10.1 FL (ref 9.2–12.9)
POTASSIUM SERPL-SCNC: 4.2 MMOL/L (ref 3.5–5.1)
PROT UR-MCNC: 25 MG/DL
PROT/CREAT UR: 0.3 MG/G{CREAT}
RBC # BLD AUTO: 4.9 M/UL (ref 4.6–6.2)
RELATIVE EOSINOPHIL (OHS): 1.3 %
RELATIVE LYMPHOCYTE (OHS): 11.8 % (ref 18–48)
RELATIVE MONOCYTE (OHS): 5.7 % (ref 4–15)
RELATIVE NEUTROPHIL (OHS): 80.5 % (ref 38–73)
SODIUM SERPL-SCNC: 138 MMOL/L (ref 136–145)
WBC # BLD AUTO: 13.18 K/UL (ref 3.9–12.7)

## 2025-08-01 PROCEDURE — 80069 RENAL FUNCTION PANEL: CPT

## 2025-08-01 PROCEDURE — 85025 COMPLETE CBC W/AUTO DIFF WBC: CPT

## 2025-08-01 PROCEDURE — 84156 ASSAY OF PROTEIN URINE: CPT

## 2025-08-01 PROCEDURE — 36415 COLL VENOUS BLD VENIPUNCTURE: CPT | Mod: PO

## 2025-08-01 PROCEDURE — 81001 URINALYSIS AUTO W/SCOPE: CPT

## 2025-08-02 LAB
BACTERIA #/AREA URNS AUTO: NORMAL /HPF
BILIRUB UR QL STRIP.AUTO: NEGATIVE
CLARITY UR: CLEAR
COLOR UR AUTO: YELLOW
GLUCOSE UR QL STRIP: ABNORMAL
HGB UR QL STRIP: NEGATIVE
HYALINE CASTS UR QL AUTO: 0 /LPF (ref 0–1)
KETONES UR QL STRIP: NEGATIVE
LEUKOCYTE ESTERASE UR QL STRIP: NEGATIVE
MICROSCOPIC COMMENT: NORMAL
NITRITE UR QL STRIP: NEGATIVE
PH UR STRIP: 7 [PH]
PROT UR QL STRIP: ABNORMAL
RBC #/AREA URNS AUTO: 1 /HPF (ref 0–4)
SP GR UR STRIP: 1.02
UROBILINOGEN UR STRIP-ACNC: NEGATIVE EU/DL
WBC #/AREA URNS AUTO: 1 /HPF (ref 0–5)
YEAST UR QL AUTO: NORMAL /HPF

## (undated) DEVICE — Device

## (undated) DEVICE — NDL BIOPSY CHIBA 22G X 15CM

## (undated) DEVICE — NDL MONOPTY BIOPSY 18GX20CM